# Patient Record
Sex: MALE | Race: WHITE | NOT HISPANIC OR LATINO | Employment: FULL TIME | URBAN - METROPOLITAN AREA
[De-identification: names, ages, dates, MRNs, and addresses within clinical notes are randomized per-mention and may not be internally consistent; named-entity substitution may affect disease eponyms.]

---

## 2021-10-09 PROCEDURE — U0004 COV-19 TEST NON-CDC HGH THRU: HCPCS | Performed by: NURSE PRACTITIONER

## 2021-11-18 PROCEDURE — 87070 CULTURE OTHR SPECIMN AEROBIC: CPT | Performed by: FAMILY MEDICINE

## 2021-11-18 PROCEDURE — 87147 CULTURE TYPE IMMUNOLOGIC: CPT | Performed by: FAMILY MEDICINE

## 2021-11-18 PROCEDURE — 87205 SMEAR GRAM STAIN: CPT | Performed by: FAMILY MEDICINE

## 2023-02-17 PROCEDURE — U0004 COV-19 TEST NON-CDC HGH THRU: HCPCS | Performed by: NURSE PRACTITIONER

## 2023-03-20 ENCOUNTER — PATIENT ROUNDING (BHMG ONLY) (OUTPATIENT)
Dept: FAMILY MEDICINE CLINIC | Facility: CLINIC | Age: 63
End: 2023-03-20
Payer: COMMERCIAL

## 2023-03-20 ENCOUNTER — PATIENT MESSAGE (OUTPATIENT)
Dept: FAMILY MEDICINE CLINIC | Facility: CLINIC | Age: 63
End: 2023-03-20

## 2023-03-20 ENCOUNTER — OFFICE VISIT (OUTPATIENT)
Dept: FAMILY MEDICINE CLINIC | Facility: CLINIC | Age: 63
End: 2023-03-20
Payer: COMMERCIAL

## 2023-03-20 VITALS
SYSTOLIC BLOOD PRESSURE: 128 MMHG | BODY MASS INDEX: 28.37 KG/M2 | OXYGEN SATURATION: 97 % | HEIGHT: 70 IN | WEIGHT: 198.2 LBS | HEART RATE: 92 BPM | DIASTOLIC BLOOD PRESSURE: 78 MMHG

## 2023-03-20 DIAGNOSIS — E11.65 TYPE 2 DIABETES MELLITUS WITH HYPERGLYCEMIA, WITHOUT LONG-TERM CURRENT USE OF INSULIN: Primary | ICD-10-CM

## 2023-03-20 DIAGNOSIS — G62.9 NEUROPATHY: ICD-10-CM

## 2023-03-20 DIAGNOSIS — E78.5 HYPERLIPIDEMIA, UNSPECIFIED HYPERLIPIDEMIA TYPE: ICD-10-CM

## 2023-03-20 DIAGNOSIS — Z12.5 PROSTATE CANCER SCREENING: ICD-10-CM

## 2023-03-20 PROBLEM — Z90.5 SINGLE KIDNEY: Status: ACTIVE | Noted: 2023-03-20

## 2023-03-20 PROBLEM — E11.9 DM (DIABETES MELLITUS), TYPE 2: Status: ACTIVE | Noted: 2023-03-20

## 2023-03-20 PROBLEM — E11.9 DM (DIABETES MELLITUS), TYPE 2: Status: RESOLVED | Noted: 2023-03-20 | Resolved: 2023-03-20

## 2023-03-20 LAB
EXPIRATION DATE: ABNORMAL
HBA1C MFR BLD: 7.9 %
Lab: ABNORMAL

## 2023-03-20 PROCEDURE — 83036 HEMOGLOBIN GLYCOSYLATED A1C: CPT | Performed by: INTERNAL MEDICINE

## 2023-03-20 PROCEDURE — 99204 OFFICE O/P NEW MOD 45 MIN: CPT | Performed by: INTERNAL MEDICINE

## 2023-03-20 NOTE — PROGRESS NOTES
Yasmani Harper  1960  [id number]  Patient Care Team:  Nolberto Nicholson MD as PCP - General (Internal Medicine)  Vicente Van MD as Consulting Physician (Gastroenterology)    Yasmani Harper is a 62 y.o. male here today to establish care.  This patient is accompanied by their self who contributes to the history of their care.    Chief Complaint:    Chief Complaint   Patient presents with   • Diabetes         History of Present Illness:   This is a 62-year-old gentleman with past medical history of diabetes mellitus, dyslipidemia, unilateral kidney secondary to right radical nephrectomy from renal cell carcinoma here to establish.  In addition to above he has diabetic neuropathy.  He openly advocates that he is not the most compliant person.  Describes himself as an attentive to his diet.  He is currently on Trulicity 3 mg weekly, indicates his metformin dose had recently been dropped/decreased however he checked with his wife and is currently on 1000 mg p.o. twice daily.  At one time he was on Jardiance however this was discontinued as his numbers improved.  He does not recall what his most recent A1c was.  He is past due on seeing ophthalmology.  He is currently on 1200 mg 3 times daily for diabetic neuropathy.      He does have meaningful dyslipidemia, he is currently on Lipitor 20 and Zetia 10 mg daily.  40 mg Lipitor caused myalgias necessitating a decrease in dose.  He is tolerating the current regimen.    He does admit to bilateral base of thumb pain that is sharp in nature.  It is intermittent.  He is a surgical tech at the [employer], fluoroscopy by the surgical resident indicated some mild degenerative changes.  He does not take anything over-the-counter.  Denies any swelling numbness or tingling.      HCM- CRS last year with Cologuard.  He has never had a pneumonia vaccination.  Typically doses flu vaccination..     Past Medical History:   Diagnosis Date   • Arthritis Years   • Diabetes  "mellitus (HCC)    • Erectile dysfunction Years   • History of kidney removal    • HL (hearing loss) Years   • Hyperlipidemia        Past Surgical History:   Procedure Laterality Date   • CYST REMOVAL     • NEPHRECTOMY     • TONSILLECTOMY          Family History   Problem Relation Age of Onset   • Cancer Father        Social History     Socioeconomic History   • Marital status:    Tobacco Use   • Smoking status: Never   • Smokeless tobacco: Never   Vaping Use   • Vaping Use: Never used   Substance and Sexual Activity   • Alcohol use: Never   • Drug use: Never   • Sexual activity: Not Currently     Partners: Female     Birth control/protection: Vasectomy       Allergies   Allergen Reactions   • Nsaids Other (See Comments)     Pt has 1 kidney unable to take NSAIDS        Review of Systems:    Review of Systems   Constitutional: Negative.    HENT: Negative.    Eyes: Negative.    Respiratory: Negative.    Cardiovascular: Negative.    Musculoskeletal: Positive for arthralgias.   Neurological:        Painful neuropathic pain in his feet.       Vitals:    03/20/23 1440   BP: 128/78   Pulse: 92   SpO2: 97%   Weight: 89.9 kg (198 lb 3.2 oz)   Height: 177.8 cm (70\")     Body mass index is 28.44 kg/m².      Current Outpatient Medications:   •  atorvastatin (LIPITOR) 20 MG tablet, , Disp: , Rfl:   •  ezetimibe (ZETIA) 10 MG tablet, , Disp: , Rfl:   •  gabapentin (NEURONTIN) 600 MG tablet, , Disp: , Rfl:   •  metFORMIN ER (GLUCOPHAGE-XR) 500 MG 24 hr tablet, , Disp: , Rfl:   •  Trulicity 1.5 MG/0.5ML solution pen-injector, Inject 3 mg as directed., Disp: , Rfl:   •  empagliflozin (Jardiance) 25 MG tablet tablet, Take 1 tablet by mouth Daily., Disp: 90 tablet, Rfl: 2    Physical Exam:    Physical Exam  Vitals and nursing note reviewed.   Constitutional:       General: He is not in acute distress.     Appearance: He is well-developed. He is not diaphoretic.   HENT:      Head: Normocephalic and atraumatic.      Right Ear: " External ear normal.      Left Ear: External ear normal.      Mouth/Throat:      Pharynx: No oropharyngeal exudate.   Eyes:      General: No scleral icterus.        Right eye: No discharge.      Conjunctiva/sclera: Conjunctivae normal.   Neck:      Thyroid: No thyromegaly.      Vascular: No JVD.      Trachea: No tracheal deviation.   Cardiovascular:      Rate and Rhythm: Normal rate and regular rhythm.      Heart sounds: Normal heart sounds.      Comments: PMI nondisplaced  Pulmonary:      Effort: Pulmonary effort is normal.      Breath sounds: Normal breath sounds. No wheezing or rales.   Abdominal:      General: Bowel sounds are normal.      Palpations: Abdomen is soft.      Tenderness: There is no abdominal tenderness. There is no guarding or rebound.   Musculoskeletal:      Cervical back: Normal range of motion and neck supple.      Comments: Normal gai.  Mild CMC tenderness to palpation especially with forced opposition.   Lymphadenopathy:      Cervical: No cervical adenopathy.   Skin:     General: Skin is warm and dry.      Capillary Refill: Capillary refill takes less than 2 seconds.      Coloration: Skin is not pale.      Findings: No rash.   Neurological:      Mental Status: He is alert and oriented to person, place, and time.      Motor: No abnormal muscle tone.      Coordination: Coordination normal.   Psychiatric:         Mood and Affect: Mood normal.         Behavior: Behavior normal.         Judgment: Judgment normal.         Procedures    Results Review:    None A1c today is 7.9    Assessment/Plan:     Problem List Items Addressed This Visit        Cardiac and Vasculature    Hyperlipidemia    Relevant Medications    atorvastatin (LIPITOR) 20 MG tablet    ezetimibe (ZETIA) 10 MG tablet    Other Relevant Orders    Comprehensive Metabolic Panel    Lipid Panel       Endocrine and Metabolic    Type 2 diabetes mellitus with hyperglycemia, without long-term current use of insulin (HCC) - Primary    Relevant  Medications    metFORMIN ER (GLUCOPHAGE-XR) 500 MG 24 hr tablet    Trulicity 1.5 MG/0.5ML solution pen-injector    empagliflozin (Jardiance) 25 MG tablet tablet    Other Relevant Orders    POC Glycosylated Hemoglobin (Hb A1C) (Completed)    Comprehensive Metabolic Panel    CBC (No Diff)       Neuro    Neuropathy    Current Assessment & Plan     Currently with gabapentin at 1200 mg po tid        Other Visit Diagnoses     Prostate cancer screening        Relevant Orders    PSA SCREENING          Plan of care reviewed with patient at the conclusion of today's visit. Education was provided regarding diagnosis and management.  Patient verbalizes understanding of and agreement with management plan.    Return in about 3 months (around 6/20/2023) for annual.    Nolberto Nicholson MD      Please note than portions of this note were completed Health system a Voice Recognition Program

## 2023-03-21 RX ORDER — METFORMIN HYDROCHLORIDE 500 MG/1
1000 TABLET, EXTENDED RELEASE ORAL 2 TIMES DAILY WITH MEALS
Qty: 60 TABLET | Refills: 2 | Status: CANCELLED | OUTPATIENT
Start: 2023-03-21

## 2023-04-03 RX ORDER — METFORMIN HYDROCHLORIDE 500 MG/1
1000 TABLET, EXTENDED RELEASE ORAL 2 TIMES DAILY
Qty: 180 TABLET | Refills: 3 | Status: SHIPPED | COMMUNITY
Start: 2023-04-03

## 2023-05-18 RX ORDER — GABAPENTIN 600 MG/1
TABLET ORAL
Status: CANCELLED | OUTPATIENT
Start: 2023-05-18

## 2023-05-18 NOTE — TELEPHONE ENCOUNTER
Rx Refill Note  Requested Prescriptions     Pending Prescriptions Disp Refills   • gabapentin (NEURONTIN) 600 MG tablet        Last office visit with prescribing clinician: 3/20/2023   Last telemedicine visit with prescribing clinician: 3/21/2023   Next office visit with prescribing clinician: 6/21/2023                         Would you like a call back once the refill request has been completed: [] Yes [] No    If the office needs to give you a call back, can they leave a voicemail: [] Yes [] No    Ai Helton MA  05/18/23, 11:48 EDT

## 2023-05-19 ENCOUNTER — OFFICE VISIT (OUTPATIENT)
Dept: FAMILY MEDICINE CLINIC | Facility: CLINIC | Age: 63
End: 2023-05-19
Payer: COMMERCIAL

## 2023-05-19 VITALS
HEART RATE: 97 BPM | BODY MASS INDEX: 26.14 KG/M2 | SYSTOLIC BLOOD PRESSURE: 126 MMHG | OXYGEN SATURATION: 97 % | WEIGHT: 182.6 LBS | HEIGHT: 70 IN | DIASTOLIC BLOOD PRESSURE: 76 MMHG

## 2023-05-19 DIAGNOSIS — G62.9 NEUROPATHY: ICD-10-CM

## 2023-05-19 DIAGNOSIS — Z51.81 MEDICATION MONITORING ENCOUNTER: Primary | ICD-10-CM

## 2023-05-19 RX ORDER — DULAGLUTIDE 3 MG/.5ML
INJECTION, SOLUTION SUBCUTANEOUS
COMMUNITY
Start: 2023-03-22

## 2023-05-19 RX ORDER — GABAPENTIN 600 MG/1
1200 TABLET ORAL 3 TIMES DAILY
Qty: 180 TABLET | Refills: 3 | Status: SHIPPED | COMMUNITY
Start: 2023-05-19 | End: 2023-05-19 | Stop reason: SDUPTHER

## 2023-05-19 RX ORDER — GABAPENTIN 600 MG/1
1200 TABLET ORAL 3 TIMES DAILY
Qty: 540 TABLET | Refills: 0 | Status: SHIPPED | OUTPATIENT
Start: 2023-05-19

## 2023-05-19 NOTE — PROGRESS NOTES
"Yasmani Harper  1960  [id number]  Patient Care Team:  Nolberto Nicholson MD as PCP - General (Internal Medicine)  Vicente Van MD as Consulting Physician (Gastroenterology)    Yasmani Harper is a 62 y.o. male here today for follow up.     This patient is accompanied by their self who contributes to the history of their care.    Chief Complaint:    Chief Complaint   Patient presents with   • Peripheral Neuropathy     Medication refill          History of Present Illness:  I have reviewed and/or updated the patient's past medical, past surgical, family, social history, problem list and allergies as appropriate.     Gentleman is here to follow-up for diabetic neuropathy.  He takes 1200 mg 3 times daily as he has done for years.  He agrees to a controlled substance agreement and urine drug screen.  He has not been checking his sugars as we discussed at his last visit.  Is too early for his A1c however he did increase his metformin to 1 g p.o. daily and continues on Jardiance 25 mg daily.  He has not been going out to the garage to get his Trulicity on the refrigerator so he has missed several doses of this.  His pain is typically in his feet burning electrical dysesthesias    Review of Systems   Constitutional: Negative.    Endocrine: Negative.    Musculoskeletal: Negative.    Neurological:        Nocturnal dysesthesias feet   Psychiatric/Behavioral: Negative.        Vitals:    05/19/23 1306   BP: 126/76   Pulse: 97   SpO2: 97%   Weight: 82.8 kg (182 lb 9.6 oz)   Height: 177.8 cm (70\")     Body mass index is 26.2 kg/m².    Physical Exam  Vitals and nursing note reviewed.   Constitutional:       General: He is not in acute distress.     Appearance: He is well-developed. He is not diaphoretic.   HENT:      Head: Normocephalic and atraumatic.      Right Ear: External ear normal.      Left Ear: External ear normal.      Mouth/Throat:      Pharynx: No oropharyngeal exudate.   Eyes:      General: No " scleral icterus.        Right eye: No discharge.      Conjunctiva/sclera: Conjunctivae normal.   Neck:      Thyroid: No thyromegaly.      Vascular: No JVD.      Trachea: No tracheal deviation.   Cardiovascular:      Rate and Rhythm: Normal rate and regular rhythm.      Heart sounds: Normal heart sounds.      Comments: PMI nondisplaced  Pulmonary:      Effort: Pulmonary effort is normal.      Breath sounds: Normal breath sounds. No wheezing or rales.   Abdominal:      General: Bowel sounds are normal.      Palpations: Abdomen is soft.      Tenderness: There is no abdominal tenderness. There is no guarding or rebound.   Musculoskeletal:      Cervical back: Normal range of motion and neck supple.   Lymphadenopathy:      Cervical: No cervical adenopathy.   Skin:     General: Skin is warm and dry.      Capillary Refill: Capillary refill takes less than 2 seconds.      Coloration: Skin is not pale.      Findings: No rash.   Neurological:      Mental Status: He is alert and oriented to person, place, and time.      Motor: No abnormal muscle tone.      Coordination: Coordination normal.   Psychiatric:         Judgment: Judgment normal.         Procedures    Results Review:    None    Assessment/Plan:    Problem List Items Addressed This Visit        Neuro    Neuropathy    Relevant Medications    gabapentin (NEURONTIN) 600 MG tablet   Other Visit Diagnoses     Medication monitoring encounter    -  Primary    Relevant Orders    Compliance Drug Analysis, Ur - Urine, Clean Catch      He has entered into a [facility] urine drug screen secured.  PDMP was reviewed.    Plan of care reviewed with patient at the conclusion of today's visit. Education was provided regarding diagnosis and management.  Patient verbalizes understanding of and agreement with management plan.    No follow-ups on file.    Nolberto Nicholson MD      Please note than portions of this note were completed [facility] a Voice Recognition Program

## 2023-05-25 LAB — DRUGS UR: NORMAL

## 2023-06-19 ENCOUNTER — PATIENT MESSAGE (OUTPATIENT)
Dept: FAMILY MEDICINE CLINIC | Facility: CLINIC | Age: 63
End: 2023-06-19
Payer: COMMERCIAL

## 2023-06-21 PROBLEM — Z00.00 ANNUAL PHYSICAL EXAM: Status: ACTIVE | Noted: 2023-06-21

## 2023-07-13 PROBLEM — R20.8 ALLODYNIA: Status: ACTIVE | Noted: 2023-07-13

## 2023-08-07 RX ORDER — ATORVASTATIN CALCIUM 20 MG/1
20 TABLET, FILM COATED ORAL NIGHTLY
Qty: 90 TABLET | Refills: 3 | Status: SHIPPED | OUTPATIENT
Start: 2023-08-07

## 2023-08-07 NOTE — TELEPHONE ENCOUNTER
Rx Refill Note  Requested Prescriptions     Pending Prescriptions Disp Refills    atorvastatin (LIPITOR) 20 MG tablet 90 tablet       Last office visit with prescribing clinician: 7/13/2023   Last telemedicine visit with prescribing clinician: Visit date not found   Next office visit with prescribing clinician: 9/21/2023                         Would you like a call back once the refill request has been completed: [] Yes [] No    If the office needs to give you a call back, can they leave a voicemail: [] Yes [] No    Elaine Ga MA  08/07/23, 07:51 EDT

## 2023-08-13 DIAGNOSIS — G62.9 NEUROPATHY: ICD-10-CM

## 2023-08-15 NOTE — TELEPHONE ENCOUNTER
Rx Refill Note  Requested Prescriptions     Pending Prescriptions Disp Refills    gabapentin (NEURONTIN) 600 MG tablet 540 tablet 0     Sig: Take 2 tablets by mouth 3 (Three) Times a Day.      Last office visit with prescribing clinician: 7/13/2023   Last telemedicine visit with prescribing clinician: Visit date not found   Next office visit with prescribing clinician: 9/21/2023                         Would you like a call back once the refill request has been completed: [] Yes [] No    If the office needs to give you a call back, can they leave a voicemail: [] Yes [] No    Paige Flores MA  08/15/23, 12:54 EDT    UDS and CSA 05/19/2023

## 2023-08-16 RX ORDER — GABAPENTIN 600 MG/1
1200 TABLET ORAL 3 TIMES DAILY
Qty: 540 TABLET | Refills: 0 | Status: SHIPPED | OUTPATIENT
Start: 2023-08-16

## 2023-09-21 ENCOUNTER — OFFICE VISIT (OUTPATIENT)
Dept: FAMILY MEDICINE CLINIC | Facility: CLINIC | Age: 63
End: 2023-09-21
Payer: COMMERCIAL

## 2023-09-21 VITALS
WEIGHT: 187.6 LBS | BODY MASS INDEX: 26.86 KG/M2 | HEART RATE: 101 BPM | DIASTOLIC BLOOD PRESSURE: 78 MMHG | SYSTOLIC BLOOD PRESSURE: 118 MMHG | OXYGEN SATURATION: 95 % | HEIGHT: 70 IN

## 2023-09-21 DIAGNOSIS — E11.65 TYPE 2 DIABETES MELLITUS WITH HYPERGLYCEMIA, WITHOUT LONG-TERM CURRENT USE OF INSULIN: Primary | ICD-10-CM

## 2023-09-21 DIAGNOSIS — E78.5 HYPERLIPIDEMIA, UNSPECIFIED HYPERLIPIDEMIA TYPE: ICD-10-CM

## 2023-09-21 DIAGNOSIS — G62.9 NEUROPATHY: ICD-10-CM

## 2023-09-21 LAB
EXPIRATION DATE: ABNORMAL
HBA1C MFR BLD: 8.4 %
Lab: ABNORMAL

## 2023-09-21 RX ORDER — DULAGLUTIDE 4.5 MG/.5ML
4.5 INJECTION, SOLUTION SUBCUTANEOUS
Qty: 2 ML | Refills: 0 | Status: SHIPPED | OUTPATIENT
Start: 2023-12-14 | End: 2024-01-05

## 2023-09-21 RX ORDER — DULAGLUTIDE 0.75 MG/.5ML
0.75 INJECTION, SOLUTION SUBCUTANEOUS
Qty: 2 ML | Refills: 0 | Status: SHIPPED | OUTPATIENT
Start: 2023-09-21 | End: 2023-10-13

## 2023-09-21 RX ORDER — DULAGLUTIDE 1.5 MG/.5ML
1.5 INJECTION, SOLUTION SUBCUTANEOUS
Qty: 2 ML | Refills: 0 | Status: SHIPPED | OUTPATIENT
Start: 2023-10-19 | End: 2023-11-10

## 2023-09-21 RX ORDER — DULAGLUTIDE 3 MG/.5ML
3 INJECTION, SOLUTION SUBCUTANEOUS
Qty: 2 ML | Refills: 0 | Status: SHIPPED | OUTPATIENT
Start: 2023-11-16 | End: 2023-12-08

## 2023-09-21 RX ORDER — GABAPENTIN 600 MG/1
1200 TABLET ORAL 3 TIMES DAILY
Qty: 540 TABLET | Refills: 0 | Status: SHIPPED | OUTPATIENT
Start: 2023-09-21

## 2023-09-21 NOTE — PROGRESS NOTES
Yasmani Harper  1960  [id number]  Patient Care Team:  Nolberto Nicholson MD as PCP - General (Internal Medicine)  Vicente Van MD as Consulting Physician (Gastroenterology)    Yasmani Harper is a 62 y.o. male here today for follow up.     This patient is accompanied by their self who contributes to the history of their care.    Chief Complaint:    Chief Complaint   Patient presents with    Diabetes        History of Present Illness:  I have reviewed and/or updated the patient's past medical, past surgical, family, social history, problem list and allergies as appropriate.     His allodynia resolved after stopping Ozempic - took 2 weeks- but nno further allodynia. He did tolerate trulicity for years without issues. He is willing to try this again. He does not use cgm currently as it was giving erroneus false lows. Denies PU/PD. Current with opth.  He continues walking for exercise.  He remains on Zetia as well as Lipitor 20 mg daily without any perceived side effects of myalgias or dark-colored urine.    Review of Systems   Constitutional:  Negative for chills, fatigue, fever, unexpected weight gain and unexpected weight loss.   HENT:  Negative for ear pain, postnasal drip, sinus pressure and sore throat.    Eyes:  Negative for blurred vision, double vision and visual disturbance.   Respiratory:  Negative for cough, shortness of breath and wheezing.    Cardiovascular:  Negative for chest pain, palpitations and leg swelling.   Gastrointestinal:  Negative for abdominal pain, blood in stool, diarrhea, nausea and vomiting.   Endocrine: Negative for cold intolerance, heat intolerance, polydipsia, polyphagia and polyuria.   Genitourinary:  Negative for dysuria, flank pain and hematuria.   Musculoskeletal:  Negative for arthralgias and joint swelling.   Skin:  Negative for dry skin and rash.   Neurological:  Negative for weakness, numbness and headache.   Psychiatric/Behavioral:  Negative for  "self-injury, suicidal ideas and depressed mood.      Vitals:    09/21/23 1543   BP: 118/78   Pulse: 101   SpO2: 95%   Weight: 85.1 kg (187 lb 9.6 oz)   Height: 177.8 cm (70\")     Body mass index is 26.92 kg/m².    Physical Exam  Vitals and nursing note reviewed.   Constitutional:       General: He is not in acute distress.     Appearance: He is well-developed. He is not diaphoretic.   HENT:      Head: Normocephalic and atraumatic.      Right Ear: External ear normal.      Left Ear: External ear normal.      Mouth/Throat:      Pharynx: No oropharyngeal exudate.   Eyes:      General: No scleral icterus.        Right eye: No discharge.      Conjunctiva/sclera: Conjunctivae normal.   Neck:      Thyroid: No thyromegaly.      Vascular: No JVD.      Trachea: No tracheal deviation.   Cardiovascular:      Rate and Rhythm: Normal rate and regular rhythm.      Heart sounds: Normal heart sounds.      Comments: PMI nondisplaced  Pulmonary:      Effort: Pulmonary effort is normal.      Breath sounds: Normal breath sounds. No wheezing or rales.   Abdominal:      General: Bowel sounds are normal.      Palpations: Abdomen is soft.      Tenderness: There is no abdominal tenderness. There is no guarding or rebound.   Musculoskeletal:      Cervical back: Normal range of motion and neck supple.   Lymphadenopathy:      Cervical: No cervical adenopathy.   Skin:     General: Skin is warm and dry.      Capillary Refill: Capillary refill takes less than 2 seconds.      Coloration: Skin is not pale.      Findings: No rash.   Neurological:      Mental Status: He is alert and oriented to person, place, and time.      Motor: No abnormal muscle tone.      Coordination: Coordination normal.   Psychiatric:         Judgment: Judgment normal.       Procedures    Results Review:    I reviewed the patient's new clinical results.  omponent  Ref Range & Units 16:08 3 mo ago 6 mo ago   Hemoglobin A1C  % 8.4 7.4 7.9       Assessment/Plan:    Problem List " Items Addressed This Visit          Cardiac and Vasculature    Hyperlipidemia    Current Assessment & Plan     Lipid abnormalities are unchanged.  Pharmacotherapy as ordered.  Lipids will be reassessed in 6 months.         Relevant Medications    ezetimibe (ZETIA) 10 MG tablet    atorvastatin (LIPITOR) 20 MG tablet       Endocrine and Metabolic    Type 2 diabetes mellitus with hyperglycemia, without long-term current use of insulin - Primary    Current Assessment & Plan     Diabetes is worsening.   Reminded to bring in blood sugar diary at next visit.  Dietary recommendations for ADA diet.  Regular aerobic exercise.  Discussed foot care.  Reminded to get yearly retinal exam.  Diabetes will be reassessed in 3 months.  Resume Trulicity has tolerated this in the past.  We will start from 0.75 mg uptitrate over time to 4.5 mg.  Continue metformin and Jardiance.         Relevant Medications    metFORMIN ER (GLUCOPHAGE-XR) 500 MG 24 hr tablet    empagliflozin (Jardiance) 25 MG tablet tablet    Dulaglutide (Trulicity) 0.75 MG/0.5ML solution pen-injector    Dulaglutide (Trulicity) 1.5 MG/0.5ML solution pen-injector (Start on 10/19/2023)    Dulaglutide (Trulicity) 3 MG/0.5ML solution pen-injector (Start on 11/16/2023)    Dulaglutide (Trulicity) 4.5 MG/0.5ML solution pen-injector (Start on 12/14/2023)    Other Relevant Orders    POC Glycosylated Hemoglobin (Hb A1C) (Completed)       Neuro    Neuropathy    Current Assessment & Plan     Continues to be controlled with the gabapentin 1200 mg 3 times daily.  Refill authorized.  , UDS and CSA reviewed         Relevant Medications    gabapentin (NEURONTIN) 600 MG tablet       Plan of care reviewed with patient at the conclusion of today's visit. Education was provided regarding diagnosis and management.  Patient verbalizes understanding of and agreement with management plan.    Return in about 3 months (around 12/21/2023) for dm2.    Nolberto Nicholson MD      Please note than  portions of this note were completed wt a Voice Recognition Program

## 2023-09-21 NOTE — ASSESSMENT & PLAN NOTE
Continues to be controlled with the gabapentin 1200 mg 3 times daily.  Refill authorized.  , UDS and CSA reviewed

## 2023-09-21 NOTE — ASSESSMENT & PLAN NOTE
Diabetes is worsening.   Reminded to bring in blood sugar diary at next visit.  Dietary recommendations for ADA diet.  Regular aerobic exercise.  Discussed foot care.  Reminded to get yearly retinal exam.  Diabetes will be reassessed in 3 months.  Resume Trulicity has tolerated this in the past.  We will start from 0.75 mg uptitrate over time to 4.5 mg.  Continue metformin and Jardiance.

## 2023-10-18 RX ORDER — DULAGLUTIDE 0.75 MG/.5ML
INJECTION, SOLUTION SUBCUTANEOUS
Qty: 2 ML | Refills: 0 | Status: SHIPPED | OUTPATIENT
Start: 2023-10-18

## 2023-10-18 NOTE — TELEPHONE ENCOUNTER
Rx Refill Note  Requested Prescriptions     Pending Prescriptions Disp Refills    Trulicity 0.75 MG/0.5ML solution pen-injector [Pharmacy Med Name: TRULICITY 0.75 MG/0.5 ML PEN] 2 mL 0     Sig: INJECT 0.75 MG UNDER THE SKIN ONCE WEEKLY FOR 4 DOSES CONTINUE FOR 4 WEEKS THEN INCREASE TO NEXT DOSE.      Last office visit with prescribing clinician: 9/21/2023     Next office visit with prescribing clinician: 12/29/2023       Jeanette Alberto MA  10/18/23, 08:54 EDT

## 2023-11-17 RX ORDER — DULAGLUTIDE 1.5 MG/.5ML
INJECTION, SOLUTION SUBCUTANEOUS
Qty: 2 ML | Refills: 1 | Status: SHIPPED | OUTPATIENT
Start: 2023-11-17

## 2023-11-27 NOTE — TELEPHONE ENCOUNTER
Nayan Valenzuela is a 70 year old male here for  Chief Complaint   Patient presents with   • Follow-up     6 week MD follow up     Denies latex allergy or sensitivity.    Medication verified, no changes.  PCP and Pharmacy verified.    Social History     Tobacco Use   Smoking Status Never   • Passive exposure: Past   Smokeless Tobacco Never     Advance Directives Filed: Yes    ECOG:   ECOG [11/27/23 1258]   ECOG Performance Status 1       Vitals:    There were no vitals taken for this visit.    These vital signs are:  Within defined parameters (Per Reference \"Defined Limits Hospital Outpatient Department (HOD)\")    Height: No.  Ht Readings from Last 1 Encounters:   11/16/23 6' 2\" (1.88 m)     Weight:Yes, shoes on.  Wt Readings from Last 3 Encounters:   11/16/23 126.5 kg (278 lb 14.1 oz)   10/30/23 128.6 kg (283 lb 9.6 oz)   10/13/23 126 kg (277 lb 12.5 oz)       BMI: There is no height or weight on file to calculate BMI.    REVIEW OF SYSTEMS  GENERAL:  Patient denies headache, fevers, chills, night sweats, excessive fatigue, change in appetite, weight loss, dizziness  ALLERGIC/IMMUNOLOGIC: Verified allergies: Yes  EYES:  Patient denies significant visual difficulties, double vision, blurred vision  ENT/MOUTH: Patient denies problems with hearing, sore throat, sinus drainage, mouth sores  ENDOCRINE:  Patient denies diabetes, thyroid disease, hormone replacement, hot flashes  HEMATOLOGIC/LYMPHATIC: Patient denies easy bruising, bleeding, tender lymph nodes, swollen lymph nodes  BREASTS: Patient denies abnormal masses of breast, nipple discharge, pain  RESPIRATORY:  Patient denies lung pain with breathing, cough, coughing up blood, shortness of breath  CARDIOVASCULAR:  Patient denies anginal chest pain, palpitations, shortness of breath when lying flat, peripheral edema  GASTROINTESTINAL: Patient denies abdominal pain , nausea, vomiting, diarrhea, GI bleeding, constipation, change in bowel habits, heartburn, sensation of  Rx Refill Note  Requested Prescriptions     Pending Prescriptions Disp Refills    Trulicity 1.5 MG/0.5ML solution pen-injector [Pharmacy Med Name: TRULICITY 1.5 MG/0.5 ML PEN] 2 mL 0     Sig: INJECT 1.5 MG UNDER THE SKIN ONCE WEEKLY FOR 4 WEEKS, THEN INCREASE TO NEXT DOSE      Last office visit with prescribing clinician: 9/21/2023     Next office visit with prescribing clinician: 12/29/2023   Vira Javier MA  11/17/23, 12:57 EST   feeling full, difficulty swallowing  : Patient denies blood in the urine, burning with urination, frequency, urgency, hesitancy, incontinence  MUSCULOSKELETAL:  Patient denies joint pain, bone pain, joint swelling, redness, decreased range of motion  SKIN:  Patient denies chronic rashes, inflammation, ulcerations, skin changes, itching  NEUROLOGIC:  Patient denies areas of focal weakness, abnormal gait, sensory problems, numbness, tingling  PSYCHIATRIC: Patient denies insomnia, depression, anxiety    This patient reported abnormal symptoms that needed immediate verbal communication: Yes, these symptoms included on ROS and were reported to MD.

## 2023-12-29 ENCOUNTER — OFFICE VISIT (OUTPATIENT)
Dept: FAMILY MEDICINE CLINIC | Facility: CLINIC | Age: 63
End: 2023-12-29
Payer: COMMERCIAL

## 2023-12-29 VITALS
HEIGHT: 70 IN | SYSTOLIC BLOOD PRESSURE: 130 MMHG | WEIGHT: 181.6 LBS | BODY MASS INDEX: 26 KG/M2 | HEART RATE: 94 BPM | OXYGEN SATURATION: 98 % | DIASTOLIC BLOOD PRESSURE: 82 MMHG

## 2023-12-29 DIAGNOSIS — J01.10 ACUTE NON-RECURRENT FRONTAL SINUSITIS: ICD-10-CM

## 2023-12-29 DIAGNOSIS — E11.65 TYPE 2 DIABETES MELLITUS WITH HYPERGLYCEMIA, WITHOUT LONG-TERM CURRENT USE OF INSULIN: Primary | ICD-10-CM

## 2023-12-29 DIAGNOSIS — E78.5 HYPERLIPIDEMIA, UNSPECIFIED HYPERLIPIDEMIA TYPE: ICD-10-CM

## 2023-12-29 LAB
EXPIRATION DATE: ABNORMAL
HBA1C MFR BLD: 7.5 % (ref 4.5–5.7)
Lab: ABNORMAL

## 2023-12-29 RX ORDER — BENZONATATE 100 MG/1
100 CAPSULE ORAL 3 TIMES DAILY PRN
Qty: 60 CAPSULE | Refills: 1 | Status: SHIPPED | OUTPATIENT
Start: 2023-12-29

## 2023-12-29 RX ORDER — FLUTICASONE PROPIONATE 50 MCG
2 SPRAY, SUSPENSION (ML) NASAL DAILY
Qty: 5 ML | Refills: 2 | Status: SHIPPED | OUTPATIENT
Start: 2023-12-29

## 2023-12-29 RX ORDER — GUAIFENESIN, PSEUDOEPHEDRINE HYDROCHLORIDE 600; 60 MG/1; MG/1
1 TABLET, EXTENDED RELEASE ORAL EVERY 12 HOURS
Qty: 28 TABLET | Refills: 0 | Status: SHIPPED | OUTPATIENT
Start: 2023-12-29

## 2023-12-29 RX ORDER — DOXYCYCLINE HYCLATE 100 MG/1
100 CAPSULE ORAL 2 TIMES DAILY
Qty: 20 CAPSULE | Refills: 0 | Status: SHIPPED | OUTPATIENT
Start: 2023-12-29

## 2023-12-29 NOTE — ASSESSMENT & PLAN NOTE
Diabetes is improving with treatment.   Continue current treatment regimen.  Reminded to bring in blood sugar diary at next visit.  Dietary recommendations for ADA diet.  Discussed foot care.  Reminded to get yearly retinal exam.  Increase Trulicity to 4.5 mg weekly  Diabetes will be reassessed in 3 months.

## 2023-12-29 NOTE — PROGRESS NOTES
Yasmani Harper  1960  [id number]  Patient Care Team:  Nolberto Nicholson MD as PCP - General (Internal Medicine)  Vicente Van MD as Consulting Physician (Gastroenterology)    Yasmani Harper is a 63 y.o. male here today for follow up.     This patient is accompanied by their self who contributes to the history of their care.    Chief Complaint:    Chief Complaint   Patient presents with    Diabetes     3 month follow up    Chills    Generalized Body Aches    Cough     Ongoing since Monday evening.    Nasal Congestion     Chest congestion also        History of Present Illness:  I have reviewed and/or updated the patient's past medical, past surgical, family, social history, problem list and allergies as appropriate.     Continue trulicity. No hypoglycemia still with neuropathic pain.  Gabapentin 600 mg or 1200 mg 3 times daily is taken.  He has not seen his ophthalmologist and is past due on this.  I reminded he and his wife of this.  Continues on highest dose of Trulicity starting this weekend.  Tolerating this without any abdominal pain nausea or vomiting.  Continues on Jardiance 25 mg daily as well as metformin.  Remains on acetaminophen being for cholesterol.  This is in addition to atorvastatin 20 mg daily.  Denies any muscle aches or urinary color changes.    He has not felt well this week.  Reports illness since Monday. Cough chills and ST. Has lots of drainage. + soa no wheezing.there is frontal pressure.  No nausea or vomiting.  Has tried some over-the-counter cough medicine without any improvement.  No hemoptysis.    He reports dysphagia with certain foods. No reflux.  No nighttime cough.  He declines GI referral at present.    Review of Systems   Constitutional: Negative.    HENT:  Positive for postnasal drip, sinus pressure and sore throat. Negative for ear pain.    Eyes: Negative.    Respiratory:  Positive for cough and shortness of breath.        Vitals:    12/29/23 1536   BP:  "130/82   Pulse: 94   SpO2: 98%   Weight: 82.4 kg (181 lb 9.6 oz)   Height: 177.8 cm (70\")     Body mass index is 26.06 kg/m².    Physical Exam    Procedures    Results Review:    I reviewed the patient's new clinical results.  1C is improved to 7.4, flu AB and COVID-negative    Assessment/Plan:    Problem List Items Addressed This Visit       Type 2 diabetes mellitus with hyperglycemia, without long-term current use of insulin - Primary    Current Assessment & Plan     Diabetes is improving with treatment.   Continue current treatment regimen.  Reminded to bring in blood sugar diary at next visit.  Dietary recommendations for ADA diet.  Discussed foot care.  Reminded to get yearly retinal exam.  Increase Trulicity to 4.5 mg weekly  Diabetes will be reassessed in 3 months.         Relevant Medications    metFORMIN ER (GLUCOPHAGE-XR) 500 MG 24 hr tablet    empagliflozin (Jardiance) 25 MG tablet tablet    Dulaglutide (Trulicity) 4.5 MG/0.5ML solution pen-injector    Trulicity 0.75 MG/0.5ML solution pen-injector    Other Relevant Orders    POC Glycosylated Hemoglobin (Hb A1C) (Completed)    Hyperlipidemia    Relevant Medications    ezetimibe (ZETIA) 10 MG tablet    atorvastatin (LIPITOR) 20 MG tablet    Acute non-recurrent frontal sinusitis    Current Assessment & Plan     Doxycycline 100 mg p.o. twice daily Flonase as well as Mucinex D.            Plan of care reviewed with patient at the conclusion of today's visit. Education was provided regarding diagnosis and management.  Patient verbalizes understanding of and agreement with management plan.    Return in about 3 months (around 3/29/2024) for dm/htn.    Nolberto Nicholson MD      Please note than portions of this note were completed wt a Voice Recognition Program          Answers submitted by the patient for this visit:  Primary Reason for Visit (Submitted on 12/28/2023)  What is the primary reason for your visit?: Other  Other (Submitted on 12/28/2023)  Please " describe your symptoms.: 3mo chk up.  Have you had these symptoms before?: No  How long have you been having these symptoms?: 1-4 days

## 2024-01-10 ENCOUNTER — TELEPHONE (OUTPATIENT)
Dept: FAMILY MEDICINE CLINIC | Facility: CLINIC | Age: 64
End: 2024-01-10
Payer: COMMERCIAL

## 2024-01-10 RX ORDER — DULAGLUTIDE 4.5 MG/.5ML
INJECTION, SOLUTION SUBCUTANEOUS
Qty: 2 ML | Refills: 1 | Status: SHIPPED | OUTPATIENT
Start: 2024-01-10 | End: 2024-01-10

## 2024-01-10 RX ORDER — DULAGLUTIDE 4.5 MG/.5ML
4.5 INJECTION, SOLUTION SUBCUTANEOUS WEEKLY
Qty: 6 ML | Refills: 2 | Status: SHIPPED | OUTPATIENT
Start: 2024-01-10

## 2024-01-10 NOTE — TELEPHONE ENCOUNTER
Rx Refill Note  Requested Prescriptions     Signed Prescriptions Disp Refills    Dulaglutide (Trulicity) 4.5 MG/0.5ML solution pen-injector 2 mL 1     Sig: INJECT 4.5 MG UNDER THE SKIN ONCE WEEKLY     Authorizing Provider: NAVEED MA     Ordering User: DOLLY URBAN      Last office visit with prescribing clinician: 12/29/2023     Next office visit with prescribing clinician: 4/1/2024   Vira Javier MA  01/10/24, 07:47 EST

## 2024-01-28 NOTE — TELEPHONE ENCOUNTER
Pharmacy Name: [id number] [place] [facility] [address] [facility] - [phone]  - [phone]      Pharmacy representative name: ERNESTO    Pharmacy representative phone number: [phone]     What medication are you calling in regards to: TRULICITY    What question does the pharmacy have: PATIENT WOULD LIKE TO GET A 3 MONTH SUPPLY. PLEASE VERIFY IF THAT IS OKAY.    Who is the provider that prescribed the medication: DR MA   to go home

## 2024-02-21 DIAGNOSIS — G62.9 NEUROPATHY: ICD-10-CM

## 2024-02-21 RX ORDER — GABAPENTIN 600 MG/1
1200 TABLET ORAL 3 TIMES DAILY
Qty: 540 TABLET | Refills: 0 | Status: SHIPPED | OUTPATIENT
Start: 2024-02-21

## 2024-02-21 NOTE — TELEPHONE ENCOUNTER
Caller: Yasmani Harper    Relationship: Self    Best call back number: [phone]    Requested Prescriptions:   Requested Prescriptions     Pending Prescriptions Disp Refills    gabapentin (NEURONTIN) 600 MG tablet 540 tablet 0     Sig: Take 2 tablets by mouth 3 (Three) Times a Day.        Pharmacy where request should be sent: [facility] [id number] [id number] & [facility] - [phone] [facility] [phone] FX     Last office visit with prescribing clinician: 12/29/2023   Last telemedicine visit with prescribing clinician: Visit date not found   Next office visit with prescribing clinician: 4/1/2024     Additional details provided by patient:     Does the patient have less than a 3 day supply:  [x] Yes  [] No    Duran Singleton Rep   02/21/24 15:50 EST

## 2024-03-05 ENCOUNTER — TELEPHONE (OUTPATIENT)
Dept: FAMILY MEDICINE CLINIC | Facility: CLINIC | Age: 64
End: 2024-03-05
Payer: COMMERCIAL

## 2024-03-05 DIAGNOSIS — F41.9 ANXIETY: Primary | ICD-10-CM

## 2024-03-05 RX ORDER — DIAZEPAM 5 MG/1
5 TABLET ORAL 2 TIMES DAILY PRN
Qty: 6 TABLET | Refills: 0 | Status: SHIPPED | OUTPATIENT
Start: 2024-03-05

## 2024-03-05 NOTE — TELEPHONE ENCOUNTER
Caller: YESY SAN    Relationship: Emergency Contact    Best call back number: [phone]     What medication are you requesting: SOMETHING TO HELP KEEP HIM CALM DURING EYE PROCEDURE.    What are your current symptoms: ANXIETY     Have you had these symptoms before:    [] Yes  [x] No    Have you been treated for these symptoms before:   [] Yes  [x] No    If a prescription is needed, what is your preferred pharmacy and phone number: [facility] [id number] - [address] - [phone] [facility] [phone]      Additional notes:  WIFE OF PATIENT HAS CALLED REQUESTING IF PCP CAN CALL SOMETHING IN FOR PATIENT FOR HIS ANXIETY WHILE HAVING EYE PROCEDURE DONE. PATIENT IS NEEDING SOMETHING TO KEEP CALM DURING PROCEDURE. WIFE IS REQUESTING A CALL BACK EITHER WAY TO LET HER KNOW IF SOMETHING WILL BE CALLED IN.

## 2024-03-05 NOTE — TELEPHONE ENCOUNTER
Left message for Yasmani Harper  to return my call.    Hub may relay message and document    Nolberto Nicholson MD  You2 hours ago (12:43 PM)       Diazepam sent to pharmacy

## 2024-03-05 NOTE — TELEPHONE ENCOUNTER
Name: YESY SAN    Relationship: Emergency Contact    Best Callback Number:     [phone] (Mobile)       HUB PROVIDED THE RELAY MESSAGE FROM THE OFFICE   PATIENT VOICED UNDERSTANDING AND HAS NO FURTHER QUESTIONS AT THIS TIME    ADDITIONAL INFORMATION:

## 2024-03-06 RX ORDER — METFORMIN HYDROCHLORIDE 500 MG/1
1000 TABLET, EXTENDED RELEASE ORAL 2 TIMES DAILY
Qty: 180 TABLET | Refills: 1 | Status: SHIPPED | OUTPATIENT
Start: 2024-03-06

## 2024-04-01 ENCOUNTER — OFFICE VISIT (OUTPATIENT)
Dept: FAMILY MEDICINE CLINIC | Facility: CLINIC | Age: 64
End: 2024-04-01
Payer: COMMERCIAL

## 2024-04-01 VITALS
SYSTOLIC BLOOD PRESSURE: 122 MMHG | OXYGEN SATURATION: 97 % | DIASTOLIC BLOOD PRESSURE: 78 MMHG | HEART RATE: 87 BPM | HEIGHT: 70 IN | BODY MASS INDEX: 24.97 KG/M2 | WEIGHT: 174.4 LBS

## 2024-04-01 DIAGNOSIS — G62.9 NEUROPATHY: ICD-10-CM

## 2024-04-01 DIAGNOSIS — E11.65 TYPE 2 DIABETES MELLITUS WITH HYPERGLYCEMIA, WITHOUT LONG-TERM CURRENT USE OF INSULIN: Primary | ICD-10-CM

## 2024-04-01 DIAGNOSIS — E78.5 HYPERLIPIDEMIA, UNSPECIFIED HYPERLIPIDEMIA TYPE: ICD-10-CM

## 2024-04-01 LAB
EXPIRATION DATE: ABNORMAL
HBA1C MFR BLD: 6.3 % (ref 4.5–5.7)
Lab: ABNORMAL

## 2024-04-01 PROCEDURE — 82043 UR ALBUMIN QUANTITATIVE: CPT | Performed by: INTERNAL MEDICINE

## 2024-04-01 RX ORDER — METFORMIN HYDROCHLORIDE 500 MG/1
1000 TABLET, EXTENDED RELEASE ORAL 2 TIMES DAILY
Qty: 360 TABLET | Refills: 1 | Status: SHIPPED | OUTPATIENT
Start: 2024-04-01

## 2024-04-01 NOTE — PROGRESS NOTES
Yasmani Harper  1960  [id number]  Patient Care Team:  Nolberto Nicholson MD as PCP - General (Internal Medicine)  Vicente Van MD as Consulting Physician (Gastroenterology)    Yasmani Harper is a 63 y.o. male here today for follow up.     This patient is accompanied by their self who contributes to the history of their care.    Chief Complaint:    Chief Complaint   Patient presents with    Diabetes    Hypertension        History of Present Illness:  I have reviewed and/or updated the patient's past medical, past surgical, family, social history, problem list and allergies as appropriate.     Yasmani is here to follow-up on diabetes pretension and dyslipidemia... Continues trulicity, 4.5 mg/week.. No hypoglycemia.  still with neuropathic pain.  Gabapentin 600 mg or 1200 mg 3 times daily is taken.  He has not seen his ophthalmologist and is past due on this.  I reminded he and his wife of this.     Tolerating Trulicity and metformin.  This without any abdominal pain nausea or vomiting.  Continues on Jardiance 25 mg daily as well as metformin.  Is any rash.  Remains on acetaminophen being for zetia/lipitorl.   Denies any muscle aches or urinary color changes.   He has had 3 injections for proliferative retinopathy. Sees them again tomorrow.  He is due for urine microalbumin today.      Review of Systems   Constitutional:  Negative for chills, fatigue, fever, unexpected weight gain and unexpected weight loss.   HENT:  Negative for ear pain, postnasal drip, sinus pressure and sore throat.    Eyes:  Negative for blurred vision, double vision and visual disturbance.   Respiratory:  Negative for cough, shortness of breath and wheezing.    Cardiovascular:  Negative for chest pain, palpitations and leg swelling.   Gastrointestinal:  Negative for abdominal pain, blood in stool, diarrhea, nausea and vomiting.   Endocrine: Negative for cold intolerance, heat intolerance, polydipsia, polyphagia and polyuria.  "  Genitourinary:  Negative for dysuria, flank pain and hematuria.   Musculoskeletal:  Negative for arthralgias and joint swelling.   Skin:  Negative for dry skin and rash.   Neurological:  Negative for weakness, numbness and headache.   Psychiatric/Behavioral:  Negative for self-injury, suicidal ideas and depressed mood.        Vitals:    04/01/24 1536   BP: 122/78   Pulse: 87   SpO2: 97%   Weight: 79.1 kg (174 lb 6.4 oz)   Height: 177.8 cm (70\")     Body mass index is 25.02 kg/m².    Physical Exam  Vitals and nursing note reviewed.   Constitutional:       General: He is not in acute distress.     Appearance: He is well-developed. He is not diaphoretic.   HENT:      Head: Normocephalic and atraumatic.      Right Ear: External ear normal.      Left Ear: External ear normal.      Mouth/Throat:      Pharynx: No oropharyngeal exudate.   Eyes:      General: No scleral icterus.        Right eye: No discharge.      Conjunctiva/sclera: Conjunctivae normal.   Neck:      Thyroid: No thyromegaly.      Vascular: No JVD.      Trachea: No tracheal deviation.   Cardiovascular:      Rate and Rhythm: Normal rate and regular rhythm.      Heart sounds: Normal heart sounds.      Comments: PMI nondisplaced  Pulmonary:      Effort: Pulmonary effort is normal.      Breath sounds: Normal breath sounds. No wheezing or rales.   Abdominal:      General: Bowel sounds are normal.      Palpations: Abdomen is soft.      Tenderness: There is no abdominal tenderness. There is no guarding or rebound.   Musculoskeletal:      Cervical back: Normal range of motion and neck supple.   Lymphadenopathy:      Cervical: No cervical adenopathy.   Skin:     General: Skin is warm and dry.      Capillary Refill: Capillary refill takes less than 2 seconds.      Coloration: Skin is not pale.      Findings: No rash.   Neurological:      Mental Status: He is alert and oriented to person, place, and time.      Motor: No abnormal muscle tone.      Coordination: " Coordination normal.   Psychiatric:         Judgment: Judgment normal.         Procedures    Results Review:    I reviewed the patient's new clinical results.  ts 9 mo ago   Total Cholesterol  0 - 200 mg/dL 109   Triglycerides  0 - 150 mg/dL 66   HDL Cholesterol  40 - 60 mg/dL 51   LDL Cholesterol  0 - 100 mg/dL 44   VLDL Cholesterol  5 - 40 mg/dL 14   LDL/HDL Ratio 0.88    Result Notes       1  Topic          Component  Ref Range & Units 15:49 3 mo ago 6 mo ago 9 mo ago 1 yr ago   Hemoglobin A1C  4.5 - 5.7 % 6.3 Abnormal  7.5 Abnormal  8.4 R 7.4 R 7.9 R   Lot Number [id number]   Expiration Date 10/08/2025 07/30/2025 05/02/2025 02/26/2025 11/14/2024   Resulting Agency [facility]        Assessment/Plan:    Problem List Items Addressed This Visit       Type 2 diabetes mellitus with hyperglycemia, without long-term current use of insulin - Primary    Relevant Medications    empagliflozin (Jardiance) 25 MG tablet tablet    Dulaglutide (Trulicity) 4.5 MG/0.5ML solution pen-injector    Dulaglutide 3 MG/0.5ML solution pen-injector    metFORMIN ER (GLUCOPHAGE-XR) 500 MG 24 hr tablet    Other Relevant Orders    POC Glycosylated Hemoglobin (Hb A1C) (Completed)    MicroAlbumin, Urine, Random - Urine, Clean Catch    Hyperlipidemia    Relevant Medications    ezetimibe (ZETIA) 10 MG tablet    atorvastatin (LIPITOR) 20 MG tablet    Neuropathy    Relevant Medications    gabapentin (NEURONTIN) 600 MG tablet    Other Relevant Orders    MicroAlbumin, Urine, Random - Urine, Clean Catch   Kudos to him for improving his diabetes.  Holding current medications.  If evidence of microalbumin increases, we will place him on low-dose and ACE inhibitor.    Plan of care reviewed with patient at the conclusion of today's visit. Education was provided  regarding diagnosis and management.  Patient verbalizes understanding of and agreement with management plan.    Return in about 3 months (around 7/1/2024) for dm/lipid, Annual.    Nolberto Nicholson MD      Please note than portions of this note were completed wth a Voice Recognition Program

## 2024-04-02 LAB — ALBUMIN UR-MCNC: <1.2 MG/DL

## 2024-05-28 DIAGNOSIS — E11.65 TYPE 2 DIABETES MELLITUS WITH HYPERGLYCEMIA, WITHOUT LONG-TERM CURRENT USE OF INSULIN: Primary | ICD-10-CM

## 2024-05-28 RX ORDER — PROCHLORPERAZINE 25 MG/1
1 SUPPOSITORY RECTAL DAILY
Qty: 10 EACH | Refills: 6 | Status: SHIPPED | OUTPATIENT
Start: 2024-05-28

## 2024-05-28 RX ORDER — PROCHLORPERAZINE 25 MG/1
1 SUPPOSITORY RECTAL DAILY
Qty: 1 EACH | Refills: 1 | Status: SHIPPED | OUTPATIENT
Start: 2024-05-28

## 2024-05-28 RX ORDER — PROCHLORPERAZINE 25 MG/1
SUPPOSITORY RECTAL
Qty: 10 EACH | Refills: 1 | Status: SHIPPED | OUTPATIENT
Start: 2024-05-28

## 2024-06-20 DIAGNOSIS — F41.9 ANXIETY: ICD-10-CM

## 2024-06-20 RX ORDER — DIAZEPAM 5 MG/1
5 TABLET ORAL 2 TIMES DAILY PRN
Qty: 6 TABLET | Refills: 0 | Status: SHIPPED | OUTPATIENT
Start: 2024-06-20

## 2024-06-20 NOTE — TELEPHONE ENCOUNTER
Rx Refill Note  Requested Prescriptions     Pending Prescriptions Disp Refills    diazePAM (Valium) 5 MG tablet 6 tablet 0     Sig: Take 1 tablet by mouth 2 (Two) Times a Day As Needed for Anxiety.      Last office visit with prescribing clinician: 4/1/2024   Last telemedicine visit with prescribing clinician: Visit date not found   Next office visit with prescribing clinician: 7/8/2024                         Would you like a call back once the refill request has been completed: [] Yes [] No    If the office needs to give you a call back, can they leave a voicemail: [] Yes [] No    Ai Helton MA  06/20/24, 07:47 EDT

## 2024-06-24 DIAGNOSIS — G62.9 NEUROPATHY: ICD-10-CM

## 2024-06-25 NOTE — TELEPHONE ENCOUNTER
Rx Refill Note  Requested Prescriptions     Pending Prescriptions Disp Refills    gabapentin (NEURONTIN) 600 MG tablet 540 tablet 0     Sig: Take 2 tablets by mouth 3 (Three) Times a Day.      Last office visit with prescribing clinician: 4/1/2024   Last telemedicine visit with prescribing clinician: Visit date not found   Next office visit with prescribing clinician: 7/18/2024       Jeanette Alberto MA  06/25/24, 15:35 EDT

## 2024-06-26 RX ORDER — GABAPENTIN 600 MG/1
1200 TABLET ORAL 3 TIMES DAILY
Qty: 540 TABLET | Refills: 0 | Status: SHIPPED | OUTPATIENT
Start: 2024-06-26

## 2024-07-01 RX ORDER — EMPAGLIFLOZIN 25 MG/1
25 TABLET, FILM COATED ORAL DAILY
Qty: 90 TABLET | Refills: 0 | Status: SHIPPED | OUTPATIENT
Start: 2024-07-01

## 2024-07-18 ENCOUNTER — OFFICE VISIT (OUTPATIENT)
Dept: FAMILY MEDICINE CLINIC | Facility: CLINIC | Age: 64
End: 2024-07-18
Payer: COMMERCIAL

## 2024-07-18 VITALS
WEIGHT: 176 LBS | HEIGHT: 70 IN | DIASTOLIC BLOOD PRESSURE: 70 MMHG | HEART RATE: 75 BPM | BODY MASS INDEX: 25.2 KG/M2 | SYSTOLIC BLOOD PRESSURE: 122 MMHG | OXYGEN SATURATION: 99 %

## 2024-07-18 DIAGNOSIS — Z12.5 PROSTATE CANCER SCREENING: ICD-10-CM

## 2024-07-18 DIAGNOSIS — E78.5 HYPERLIPIDEMIA, UNSPECIFIED HYPERLIPIDEMIA TYPE: ICD-10-CM

## 2024-07-18 DIAGNOSIS — Z00.00 ANNUAL PHYSICAL EXAM: ICD-10-CM

## 2024-07-18 DIAGNOSIS — Z11.59 ENCOUNTER FOR HEPATITIS C SCREENING TEST FOR LOW RISK PATIENT: ICD-10-CM

## 2024-07-18 DIAGNOSIS — G62.9 NEUROPATHY: ICD-10-CM

## 2024-07-18 DIAGNOSIS — E11.65 TYPE 2 DIABETES MELLITUS WITH HYPERGLYCEMIA, WITHOUT LONG-TERM CURRENT USE OF INSULIN: Primary | ICD-10-CM

## 2024-07-18 LAB
EXPIRATION DATE: ABNORMAL
HBA1C MFR BLD: 6.6 % (ref 4.5–5.7)
Lab: ABNORMAL

## 2024-07-18 PROCEDURE — 83036 HEMOGLOBIN GLYCOSYLATED A1C: CPT | Performed by: INTERNAL MEDICINE

## 2024-07-18 PROCEDURE — 99396 PREV VISIT EST AGE 40-64: CPT | Performed by: INTERNAL MEDICINE

## 2024-07-18 PROCEDURE — 99214 OFFICE O/P EST MOD 30 MIN: CPT | Performed by: INTERNAL MEDICINE

## 2024-07-18 RX ORDER — METFORMIN HYDROCHLORIDE 500 MG/1
500 TABLET, EXTENDED RELEASE ORAL DAILY
Qty: 90 TABLET | Refills: 2 | Status: SHIPPED | OUTPATIENT
Start: 2024-07-18

## 2024-07-18 NOTE — ASSESSMENT & PLAN NOTE
Lipid abnormalities are stable    Plan:  Continue same medication/s without change.      Counseled patient on lifestyle modifications to help control hyperlipidemia.     Patient Treatment Goals:   LDL goal is under 100    Followup in 1 year.

## 2024-07-18 NOTE — PROGRESS NOTES
Yasmani Harper  1960  [id number]  Patient Care Team:  Nolberto Nicholson MD as PCP - General (Internal Medicine)  Vicente Van MD as Consulting Physician (Gastroenterology)    Yasmani Harper is a 63 y.o. male who is here today for his annual physical   This patient is accompanied by his self who contributes to the history of his care.    Chief Complaint:    Chief Complaint   Patient presents with    Annual Exam     Diabetes, lipids        History of Present Illness:     Here for annual exam.  He is current on his screenings.  Vaccinations are up-to-date.  He continues to exercise has lost down from 198 pounds when first visiting this office 176 today.  His weight has been as low as the mid 160s.  His fsbs have continued to be low ( 80 to 60) remains on only 500m g metformina daily with 3 mg trulicity.  He is asymptomatic with his hypoglycemia.  Denies any polyuria polydipsia.  Maintains strict currency with his ophthalmologist this has been-procedures on his retina.  Still with painful neuropathy which he takes gabapentin.  Occasional orthostasis despite being on no blood pressure medicines.  Has not had any syncope or palpitations.  Remains on Lipitor 20 mg daily as well as Zetia.  Denies any myalgias.  He is nonfasting today and will return for blood work.  Current with dentistry safety measures discussed.    Past Medical History:   Diagnosis Date    Arthritis Years    Cancer     Renal cell    Diabetes mellitus     Erectile dysfunction Years    History of kidney removal     HL (hearing loss) Years    Hyperlipidemia     Kidney stone 1980       Past Surgical History:   Procedure Laterality Date    ADENOIDECTOMY      COLONOSCOPY      CYST REMOVAL      NEPHRECTOMY      TONSILLECTOMY          Family History   Problem Relation Age of Onset    Cancer Father     Arthritis Maternal Grandmother        Social History     Socioeconomic History    Marital status:    Tobacco Use    Smoking status:  Never    Smokeless tobacco: Never   Vaping Use    Vaping status: Never Used   Substance and Sexual Activity    Alcohol use: Never    Drug use: Never    Sexual activity: Not Currently     Partners: Female     Birth control/protection: Vasectomy       Allergies   Allergen Reactions    Nsaids Other (See Comments)     Pt has 1 kidney unable to take NSAIDS        Depression: PHQ-2 Depression Screening  Little interest or pleasure in doing things? 0-->not at all   Feeling down, depressed, or hopeless? 0-->not at all   PHQ-2 Total Score 0      Immunization History   Administered Date(s) Administered    COVID-19 (MODERNA) 1st,2nd,3rd Dose Monovalent 08/16/2021, 09/13/2021    Flu Vaccine Quad PF >36MO 12/01/2016    Flublock Quad =>18yrs 09/14/2023    Pneumococcal Conjugate 20-Valent (PCV20) 06/21/2023    Pneumococcal Polysaccharide (PPSV23) 02/16/2017    Tdap 02/16/2017       Review of Systems:    Review of Systems   Constitutional:  Negative for chills, fatigue, fever, unexpected weight gain and unexpected weight loss.   HENT:  Negative for ear pain, postnasal drip, sinus pressure and sore throat.    Eyes:  Negative for blurred vision, double vision and visual disturbance.   Respiratory:  Negative for cough, shortness of breath and wheezing.    Cardiovascular:  Negative for chest pain, palpitations and leg swelling.   Gastrointestinal:  Negative for abdominal pain, blood in stool, diarrhea, nausea and vomiting.   Endocrine: Negative for cold intolerance, heat intolerance, polydipsia, polyphagia and polyuria.   Genitourinary:  Negative for dysuria, flank pain and hematuria.   Musculoskeletal:  Negative for arthralgias and joint swelling.   Skin:  Negative for dry skin and rash.   Neurological:  Negative for weakness, numbness and headache.        Burning foot pain   Psychiatric/Behavioral:  Negative for self-injury, suicidal ideas and depressed mood.        Vitals:    07/18/24 1546   BP: 122/70   Pulse: 75   SpO2: 99%  "  Weight: 79.8 kg (176 lb)   Height: 177.8 cm (70\")     Body mass index is 25.25 kg/m².      Current Outpatient Medications:     atorvastatin (LIPITOR) 20 MG tablet, Take 1 tablet by mouth Every Night., Disp: 90 tablet, Rfl: 3    Continuous Glucose Sensor (Dexcom G6 Sensor), Use Every 10 (Ten) Days., Disp: 10 each, Rfl: 1    diazePAM (Valium) 5 MG tablet, Take 1 tablet by mouth 2 (Two) Times a Day As Needed for Anxiety., Disp: 6 tablet, Rfl: 0    ezetimibe (ZETIA) 10 MG tablet, , Disp: , Rfl:     fluticasone (FLONASE) 50 MCG/ACT nasal spray, 2 sprays into the nostril(s) as directed by provider Daily., Disp: 5 mL, Rfl: 2    gabapentin (NEURONTIN) 600 MG tablet, Take 2 tablets by mouth 3 (Three) Times a Day., Disp: 540 tablet, Rfl: 0    metFORMIN ER (GLUCOPHAGE-XR) 500 MG 24 hr tablet, Take 1 tablet by mouth Daily., Disp: 90 tablet, Rfl: 2    Continuous Glucose  (Dexcom G6 ) device, Use 1 each Daily. Diagnosis:E11.9, Disp: 1 each, Rfl: 1    Continuous Glucose Transmitter (Dexcom G6 Transmitter) misc, Use 1 each Daily. Diagnosis:E11.9, Disp: 10 each, Rfl: 6    Dulaglutide 3 MG/0.5ML solution pen-injector, Inject 0.5 mL under the skin into the appropriate area as directed 1 (One) Time Per Week. (Patient not taking: Reported on 7/18/2024), Disp: 3 mL, Rfl: 9    Physical Exam:    Physical Exam  Vitals and nursing note reviewed.   Constitutional:       General: He is not in acute distress.     Appearance: He is well-developed. He is not diaphoretic.   HENT:      Head: Normocephalic and atraumatic.      Right Ear: External ear normal.      Left Ear: External ear normal.      Mouth/Throat:      Pharynx: No oropharyngeal exudate.   Eyes:      General: No scleral icterus.        Right eye: No discharge.      Conjunctiva/sclera: Conjunctivae normal.   Neck:      Thyroid: No thyromegaly.      Vascular: No JVD.      Trachea: No tracheal deviation.   Cardiovascular:      Rate and Rhythm: Normal rate and regular " rhythm.      Pulses:           Dorsalis pedis pulses are 2+ on the right side and 2+ on the left side.        Posterior tibial pulses are 2+ on the right side and 2+ on the left side.      Heart sounds: Normal heart sounds.      Comments: PMI nondisplaced  Pulmonary:      Effort: Pulmonary effort is normal.      Breath sounds: Normal breath sounds. No wheezing or rales.   Abdominal:      General: Bowel sounds are normal.      Palpations: Abdomen is soft.      Tenderness: There is no abdominal tenderness. There is no guarding or rebound.   Musculoskeletal:      Cervical back: Normal range of motion and neck supple.   Feet:      Right foot:      Protective Sensation: 5 sites tested.  2 sites sensed.      Skin integrity: Callus and dry skin present.      Toenail Condition: Right toenails are long.      Left foot:      Protective Sensation: 5 sites tested.  2 sites sensed.      Skin integrity: Callus and dry skin present.      Toenail Condition: Left toenails are long.      Comments:   both great toes have hard calluses skin is dry.  Nails are in good repair however needs trimming    Lymphadenopathy:      Cervical: No cervical adenopathy.   Skin:     General: Skin is warm and dry.      Capillary Refill: Capillary refill takes less than 2 seconds.      Coloration: Skin is not pale.      Findings: No rash.   Neurological:      Mental Status: He is alert and oriented to person, place, and time.      Motor: No abnormal muscle tone.      Coordination: Coordination normal.   Psychiatric:         Mood and Affect: Mood normal.         Behavior: Behavior normal.         Judgment: Judgment normal.         Procedures    Results Review:    I reviewed the patient's new clinical results.  Globin A1c stable at 6.6    Assessment/Plan:    Problem List Items Addressed This Visit       Type 2 diabetes mellitus with hyperglycemia, without long-term current use of insulin - Primary    Current Assessment & Plan     Diabetes is improving with  treatment.   Medication changes per orders.  Recommended a Mediterranean style of eating  Regular aerobic exercise.  Discussed sick day management.  Discussed foot care.  Reminded to get yearly retinal exam.  Recommended podiatry visit for callus paring  Diabetes will be reassessed in 3 months I would decrease his metformin to once daily 500 mg continued Trulicity.  If hypoglycemia still persist, discontinue metformin altogether.         Relevant Medications    Dulaglutide 3 MG/0.5ML solution pen-injector    Continuous Glucose Transmitter (Dexcom G6 Transmitter) misc    Continuous Glucose  (Dexcom G6 ) device    Continuous Glucose Sensor (Dexcom G6 Sensor)    metFORMIN ER (GLUCOPHAGE-XR) 500 MG 24 hr tablet    Other Relevant Orders    POC Glycosylated Hemoglobin (Hb A1C) (Completed)    TSH Rfx On Abnormal To Free T4    Hyperlipidemia    Current Assessment & Plan      Lipid abnormalities are stable    Plan:  Continue same medication/s without change.      Counseled patient on lifestyle modifications to help control hyperlipidemia.     Patient Treatment Goals:   LDL goal is under 100    Followup in 1 year.         Relevant Medications    ezetimibe (ZETIA) 10 MG tablet    atorvastatin (LIPITOR) 20 MG tablet    Other Relevant Orders    Lipid Panel    CBC (No Diff)    Neuropathy    Current Assessment & Plan     Symptoms markedly improved on gabapentin.  No refills needed.         Relevant Medications    gabapentin (NEURONTIN) 600 MG tablet    Annual physical exam     Other Visit Diagnoses       Prostate cancer screening        Relevant Orders    PSA Screen    Encounter for hepatitis C screening test for low risk patient        Relevant Orders    Hepatitis C antibody            Plan of care was reviewed with patient at the conclusion of today's visit. Counseled patient with regards to good nutrition and diet. Maintaining a healthy lifestyle including exercise and physical activities. Spoke with patient on  ways to reduce stress, getting adequate sleep and injury prevention.  Discussed prostate cancer screening, colon cancer screening including benefit of early detection and potential need for follow-up. Patient agrees to screenings today. Annual dental and eye exams were encouraged. Encouraged patient to continue to follow up with annual immunizations.     Return in about 3 months (around 10/18/2024) for dm.    Nolberto Nicholson MD      Please note than portions of this note were completed wth a Voice Recognition Program

## 2024-07-18 NOTE — ASSESSMENT & PLAN NOTE
Diabetes is improving with treatment.   Medication changes per orders.  Recommended a Mediterranean style of eating  Regular aerobic exercise.  Discussed sick day management.  Discussed foot care.  Reminded to get yearly retinal exam.  Recommended podiatry visit for callus paring  Diabetes will be reassessed in 3 months I would decrease his metformin to once daily 500 mg continued Trulicity.  If hypoglycemia still persist, discontinue metformin altogether.

## 2024-07-21 ENCOUNTER — LAB (OUTPATIENT)
Dept: LAB | Facility: HOSPITAL | Age: 64
End: 2024-07-21
Payer: COMMERCIAL

## 2024-07-21 DIAGNOSIS — Z11.59 ENCOUNTER FOR HEPATITIS C SCREENING TEST FOR LOW RISK PATIENT: ICD-10-CM

## 2024-07-21 DIAGNOSIS — E11.65 TYPE 2 DIABETES MELLITUS WITH HYPERGLYCEMIA, WITHOUT LONG-TERM CURRENT USE OF INSULIN: ICD-10-CM

## 2024-07-21 DIAGNOSIS — E78.5 HYPERLIPIDEMIA, UNSPECIFIED HYPERLIPIDEMIA TYPE: ICD-10-CM

## 2024-07-21 DIAGNOSIS — Z12.5 PROSTATE CANCER SCREENING: ICD-10-CM

## 2024-07-21 LAB
CHOLEST SERPL-MCNC: 108 MG/DL (ref 0–200)
DEPRECATED RDW RBC AUTO: 42.5 FL (ref 37–54)
ERYTHROCYTE [DISTWIDTH] IN BLOOD BY AUTOMATED COUNT: 12.3 % (ref 12.3–15.4)
HCT VFR BLD AUTO: 43.6 % (ref 37.5–51)
HCV AB SER QL: NORMAL
HDLC SERPL-MCNC: 54 MG/DL (ref 40–60)
HGB BLD-MCNC: 14.6 G/DL (ref 13–17.7)
LDLC SERPL CALC-MCNC: 39 MG/DL (ref 0–100)
LDLC/HDLC SERPL: 0.74 {RATIO}
MCH RBC QN AUTO: 31.5 PG (ref 26.6–33)
MCHC RBC AUTO-ENTMCNC: 33.5 G/DL (ref 31.5–35.7)
MCV RBC AUTO: 94 FL (ref 79–97)
PLATELET # BLD AUTO: 233 10*3/MM3 (ref 140–450)
PMV BLD AUTO: 9.4 FL (ref 6–12)
PSA SERPL-MCNC: 0.79 NG/ML (ref 0–4)
RBC # BLD AUTO: 4.64 10*6/MM3 (ref 4.14–5.8)
TRIGL SERPL-MCNC: 69 MG/DL (ref 0–150)
TSH SERPL DL<=0.05 MIU/L-ACNC: 1.05 UIU/ML (ref 0.27–4.2)
VLDLC SERPL-MCNC: 15 MG/DL (ref 5–40)
WBC NRBC COR # BLD AUTO: 6.3 10*3/MM3 (ref 3.4–10.8)

## 2024-07-21 PROCEDURE — 85027 COMPLETE CBC AUTOMATED: CPT

## 2024-07-21 PROCEDURE — 80061 LIPID PANEL: CPT

## 2024-07-21 PROCEDURE — 86803 HEPATITIS C AB TEST: CPT

## 2024-07-21 PROCEDURE — 36415 COLL VENOUS BLD VENIPUNCTURE: CPT

## 2024-07-21 PROCEDURE — G0103 PSA SCREENING: HCPCS

## 2024-07-21 PROCEDURE — 84443 ASSAY THYROID STIM HORMONE: CPT

## 2024-08-13 RX ORDER — ATORVASTATIN CALCIUM 20 MG/1
20 TABLET, FILM COATED ORAL NIGHTLY
Qty: 90 TABLET | Refills: 3 | Status: SHIPPED | OUTPATIENT
Start: 2024-08-13

## 2024-08-13 NOTE — TELEPHONE ENCOUNTER
Rx Refill Note  Requested Prescriptions     Pending Prescriptions Disp Refills    atorvastatin (LIPITOR) 20 MG tablet [Pharmacy Med Name: ATORVASTATIN 20 MG TABLET] 90 tablet 3     Sig: TAKE ONE TABLET BY MOUTH ONCE NIGHTLY      Last office visit with prescribing clinician: 7/18/2024   Last telemedicine visit with prescribing clinician: Visit date not found   Next office visit with prescribing clinician: 10/18/2024                         Would you like a call back once the refill request has been completed: [] Yes [] No    If the office needs to give you a call back, can they leave a voicemail: [] Yes [] No    Paige Flores MA  08/13/24, 13:20 EDT

## 2024-10-18 ENCOUNTER — OFFICE VISIT (OUTPATIENT)
Dept: FAMILY MEDICINE CLINIC | Facility: CLINIC | Age: 64
End: 2024-10-18
Payer: COMMERCIAL

## 2024-10-18 VITALS
SYSTOLIC BLOOD PRESSURE: 124 MMHG | BODY MASS INDEX: 27.43 KG/M2 | HEIGHT: 70 IN | WEIGHT: 191.6 LBS | DIASTOLIC BLOOD PRESSURE: 78 MMHG

## 2024-10-18 DIAGNOSIS — F41.9 ANXIETY: ICD-10-CM

## 2024-10-18 DIAGNOSIS — G62.9 NEUROPATHY: ICD-10-CM

## 2024-10-18 DIAGNOSIS — Z51.81 THERAPEUTIC DRUG MONITORING: Primary | ICD-10-CM

## 2024-10-18 DIAGNOSIS — E11.65 TYPE 2 DIABETES MELLITUS WITH HYPERGLYCEMIA, WITHOUT LONG-TERM CURRENT USE OF INSULIN: ICD-10-CM

## 2024-10-18 PROBLEM — E11.3553 STABLE PROLIFERATIVE DIABETIC RETINOPATHY OF BOTH EYES ASSOCIATED WITH TYPE 2 DIABETES MELLITUS: Status: ACTIVE | Noted: 2024-10-18

## 2024-10-18 LAB
EXPIRATION DATE: ABNORMAL
HBA1C MFR BLD: 8.2 % (ref 4.5–5.7)
Lab: ABNORMAL

## 2024-10-18 PROCEDURE — 83036 HEMOGLOBIN GLYCOSYLATED A1C: CPT | Performed by: INTERNAL MEDICINE

## 2024-10-18 PROCEDURE — 99214 OFFICE O/P EST MOD 30 MIN: CPT | Performed by: INTERNAL MEDICINE

## 2024-10-18 RX ORDER — KETOROLAC TROMETHAMINE 30 MG/ML
1 INJECTION, SOLUTION INTRAMUSCULAR; INTRAVENOUS DAILY
Qty: 1 EACH | Refills: 11 | Status: SHIPPED | OUTPATIENT
Start: 2024-10-18

## 2024-10-18 RX ORDER — DULAGLUTIDE 4.5 MG/.5ML
4.5 INJECTION, SOLUTION SUBCUTANEOUS WEEKLY
Qty: 3 ML | Refills: 2 | Status: SHIPPED | OUTPATIENT
Start: 2024-10-18

## 2024-10-18 RX ORDER — DIAZEPAM 5 MG
5 TABLET ORAL 2 TIMES DAILY PRN
Qty: 6 TABLET | Refills: 2 | Status: SHIPPED | OUTPATIENT
Start: 2024-10-18

## 2024-10-18 RX ORDER — BLOOD-GLUCOSE SENSOR
1 EACH MISCELLANEOUS
Qty: 10 EACH | Refills: 9 | Status: SHIPPED | OUTPATIENT
Start: 2024-10-18

## 2024-10-18 RX ORDER — GABAPENTIN 600 MG/1
1200 TABLET ORAL 3 TIMES DAILY
Qty: 540 TABLET | Refills: 0 | Status: SHIPPED | OUTPATIENT
Start: 2024-10-18

## 2024-10-18 NOTE — ASSESSMENT & PLAN NOTE
Diabetes is worsening.   Medication changes per orders.  Recommended a Mediterranean style of eating  Regular aerobic exercise.  Discussed ways to avoid symptomatic hypoglycemia.  Discussed foot care.  Reminded to get yearly retinal exam.  Diabetes will be reassessed in 3 months

## 2024-10-18 NOTE — ASSESSMENT & PLAN NOTE
Symptoms markedly improved on gabapentin.  Refills authorized on gabapentin.  U[place] PDMP reviewed CSA on file

## 2024-10-18 NOTE — PROGRESS NOTES
Yasmani Harper  1960  [id number]  Patient Care Team:  Nolberto Nicholson MD as PCP - General (Internal Medicine)  Vicente Van MD as Consulting Physician (Gastroenterology)    Yasmani Harper is a 64 y.o. male here today for follow up.     This patient is accompanied by their self who contributes to the history of their care.    Chief Complaint:    Chief Complaint   Patient presents with    Diabetes        History of Present Illness:  I have reviewed and/or updated the patient's past medical, past surgical, family, social history, problem list and allergies as appropriate.     Still getting Left retinal injections about every 2 weeks. Seems to be stabilizing. He has gained weight. Still on trulicity 3 mg  with on e metformin daily.  Still with painful neuropathy requiring high dose gabapentin. He does need a reflii.  Continues on zetia and lipitorfor lipids control as well  He has noted no allodynia since returning to Trulicity.  Was highly intolerant of Ozempic.  Denies any muscle aches or urinary color changes on Lipitor  Review of Systems   Constitutional:  Negative for chills, fatigue, fever, unexpected weight gain and unexpected weight loss.   HENT:  Negative for ear pain, postnasal drip, sinus pressure and sore throat.    Eyes:  Negative for blurred vision, double vision and visual disturbance.   Respiratory:  Negative for cough, shortness of breath and wheezing.    Cardiovascular:  Negative for chest pain, palpitations and leg swelling.   Gastrointestinal:  Negative for abdominal pain, blood in stool, diarrhea, nausea and vomiting.   Endocrine: Negative for cold intolerance, heat intolerance, polydipsia, polyphagia and polyuria.   Genitourinary:  Negative for dysuria, flank pain and hematuria.   Musculoskeletal:  Negative for arthralgias and joint swelling.   Skin:  Negative for dry skin and rash.   Neurological:  Negative for weakness, numbness and headache.   Psychiatric/Behavioral:   "Negative for self-injury, suicidal ideas and depressed mood.        Vitals:    10/18/24 1527   BP: 124/78   Weight: 86.9 kg (191 lb 9.6 oz)   Height: 177.8 cm (70\")     Body mass index is 27.49 kg/m².    Physical Exam  Vitals reviewed.   Constitutional:       Appearance: He is well-developed.   HENT:      Head: Normocephalic and atraumatic.   Eyes:      Conjunctiva/sclera: Conjunctivae normal.   Cardiovascular:      Rate and Rhythm: Normal rate and regular rhythm.      Heart sounds: Normal heart sounds.   Pulmonary:      Effort: Pulmonary effort is normal.      Breath sounds: Normal breath sounds.   Abdominal:      Palpations: Abdomen is soft.   Musculoskeletal:         General: Normal range of motion.      Cervical back: Normal range of motion and neck supple.   Skin:     General: Skin is warm and dry.      Capillary Refill: Capillary refill takes less than 2 seconds.   Neurological:      Mental Status: He is alert and oriented to person, place, and time.      Sensory: No sensory deficit.      Deep Tendon Reflexes: Reflexes normal.   Psychiatric:         Mood and Affect: Mood normal.         Behavior: Behavior normal.         Procedures    Results Review:    I reviewed the patient's new clinical results. Hba1c 7.5     Assessment/Plan:    Problem List Items Addressed This Visit       Type 2 diabetes mellitus with hyperglycemia, without long-term current use of insulin    Current Assessment & Plan     Diabetes is worsening.   Medication changes per orders.  Recommended a Mediterranean style of eating  Regular aerobic exercise.  Discussed ways to avoid symptomatic hypoglycemia.  Discussed foot care.  Reminded to get yearly retinal exam.  Diabetes will be reassessed in 3 months         Relevant Medications    Continuous Glucose Transmitter (Dexcom G6 Transmitter) misc    Continuous Glucose  (Dexcom G6 ) device    metFORMIN ER (GLUCOPHAGE-XR) 500 MG 24 hr tablet    Dulaglutide (Trulicity) 4.5 MG/0.5ML " solution pen-injector    Other Relevant Orders    POC Glycosylated Hemoglobin (Hb A1C) (Completed)    Neuropathy    Current Assessment & Plan     Symptoms markedly improved on gabapentin.  Refills authorized on gabapentin.  [place] PDMP reviewed CSA on file         Relevant Medications    gabapentin (NEURONTIN) 600 MG tablet    Dulaglutide (Trulicity) 4.5 MG/0.5ML solution pen-injector    Continuous Glucose Sensor (FreeStyle Ria 3 Sensor) misc    Continuous Glucose  (FreeStyle Ria 3 [place]) device     Other Visit Diagnoses       Therapeutic drug monitoring    -  Primary    Relevant Orders    Compliance Drug Analysis, Ur - Urine, Clean Catch    Anxiety        Relevant Medications    diazePAM (Valium) 5 MG tablet            Plan of care reviewed with patient at the conclusion of today's visit. Education was provided regarding diagnosis and management.  Patient verbalizes understanding of and agreement with management plan.    Return in about 3 months (around 1/18/2025) for dm.    Nolberto Nicholson MD      Please note than portions of this note were completed wth a Voice Recognition Program          Answers submitted by the patient for this visit:  Other (Submitted on 10/17/2024)  Please describe your symptoms.: None. It's a checkup  Have you had these symptoms before?: Yes  How long have you been having these symptoms?: Greater than 2 weeks  Primary Reason for Visit (Submitted on 10/17/2024)  What is the primary reason for your visit?: Problem Not Listed

## 2024-10-18 NOTE — ASSESSMENT & PLAN NOTE
Lipid abnormalities are stable     Plan:  Continue same medication/s without change.       Counseled patient on lifestyle modifications to help control hyperlipidemia.      Patient Treatment Goals:   LDL goal is under 100     Followup in 6 mon

## 2024-10-22 DIAGNOSIS — E11.65 TYPE 2 DIABETES MELLITUS WITH HYPERGLYCEMIA, WITHOUT LONG-TERM CURRENT USE OF INSULIN: Primary | ICD-10-CM

## 2024-10-22 RX ORDER — BLOOD-GLUCOSE SENSOR
EACH MISCELLANEOUS DAILY
Qty: 2 EACH | Refills: 11 | Status: SHIPPED | OUTPATIENT
Start: 2024-10-22

## 2024-10-29 LAB — DRUGS UR: NORMAL

## 2024-12-26 RX ORDER — METFORMIN HYDROCHLORIDE 500 MG/1
1000 TABLET, EXTENDED RELEASE ORAL 2 TIMES DAILY
Qty: 180 TABLET | Refills: 2 | Status: SHIPPED | OUTPATIENT
Start: 2024-12-26

## 2024-12-26 NOTE — TELEPHONE ENCOUNTER
Rx Refill Note  Requested Prescriptions     Pending Prescriptions Disp Refills    metFORMIN ER (GLUCOPHAGE-XR) 500 MG 24 hr tablet [Pharmacy Med Name: METFORMIN HCL  MG TABLET] 180 tablet      Sig: TAKE 2 TABLETS BY MOUTH TWICE A DAY      Last office visit with prescribing clinician: 10/18/2024   Last telemedicine visit with prescribing clinician: Visit date not found   Next office visit with prescribing clinician: 1/16/2025                         Would you like a call back once the refill request has been completed: [] Yes [] No    If the office needs to give you a call back, can they leave a voicemail: [] Yes [] No    Paige Flores MA  12/26/24, 11:26 EST

## 2025-01-14 DIAGNOSIS — G62.9 NEUROPATHY: ICD-10-CM

## 2025-01-14 RX ORDER — DULAGLUTIDE 4.5 MG/.5ML
INJECTION, SOLUTION SUBCUTANEOUS
Qty: 2 ML | Refills: 2 | Status: SHIPPED | OUTPATIENT
Start: 2025-01-14 | End: 2025-01-16

## 2025-01-14 NOTE — TELEPHONE ENCOUNTER
Rx Refill Note  Requested Prescriptions     Pending Prescriptions Disp Refills    Trulicity 4.5 MG/0.5ML solution auto-injector [Pharmacy Med Name: TRULICITY 4.5 MG/0.5 ML PEN] 2 mL      Sig: INJECT 4.5 MG UNDER THE SKIN ONCE WEEKLY      Last office visit with prescribing clinician: 10/18/2024   Last telemedicine visit with prescribing clinician: Visit date not found   Next office visit with prescribing clinician: 1/16/2025                         Would you like a call back once the refill request has been completed: [] Yes [] No    If the office needs to give you a call back, can they leave a voicemail: [] Yes [] No    Paige Flores MA  01/14/25, 09:04 EST

## 2025-01-16 ENCOUNTER — PRIOR AUTHORIZATION (OUTPATIENT)
Dept: FAMILY MEDICINE CLINIC | Facility: CLINIC | Age: 65
End: 2025-01-16

## 2025-01-16 ENCOUNTER — OFFICE VISIT (OUTPATIENT)
Dept: FAMILY MEDICINE CLINIC | Facility: CLINIC | Age: 65
End: 2025-01-16
Payer: COMMERCIAL

## 2025-01-16 VITALS
DIASTOLIC BLOOD PRESSURE: 75 MMHG | HEART RATE: 82 BPM | SYSTOLIC BLOOD PRESSURE: 125 MMHG | BODY MASS INDEX: 28.26 KG/M2 | OXYGEN SATURATION: 98 % | HEIGHT: 70 IN | WEIGHT: 197.4 LBS

## 2025-01-16 DIAGNOSIS — E78.5 HYPERLIPIDEMIA, UNSPECIFIED HYPERLIPIDEMIA TYPE: ICD-10-CM

## 2025-01-16 DIAGNOSIS — E11.65 TYPE 2 DIABETES MELLITUS WITH HYPERGLYCEMIA, WITHOUT LONG-TERM CURRENT USE OF INSULIN: Primary | ICD-10-CM

## 2025-01-16 DIAGNOSIS — G62.9 NEUROPATHY: ICD-10-CM

## 2025-01-16 LAB
EXPIRATION DATE: ABNORMAL
HBA1C MFR BLD: 7.4 % (ref 4.5–5.7)
Lab: ABNORMAL

## 2025-01-16 PROCEDURE — 83036 HEMOGLOBIN GLYCOSYLATED A1C: CPT | Performed by: INTERNAL MEDICINE

## 2025-01-16 PROCEDURE — 99214 OFFICE O/P EST MOD 30 MIN: CPT | Performed by: INTERNAL MEDICINE

## 2025-01-16 RX ORDER — GABAPENTIN 600 MG/1
1200 TABLET ORAL 3 TIMES DAILY
Qty: 540 TABLET | Refills: 0 | Status: SHIPPED | OUTPATIENT
Start: 2025-01-16

## 2025-01-16 NOTE — TELEPHONE ENCOUNTER
(Key: [id number]) - [id number]  Trulicity 4.5MG/0.5ML auto-injectors  status: PA Response - ApprovedCreated: January 14th, 2025 [phone]: January 16th, 2025    Approved today by [facility] 2017  Your PA request has been approved. Additional information will be provided in the approval communication. (Message 1145)  Authorization Expiration Date: 1/16/2026

## 2025-01-16 NOTE — PROGRESS NOTES
"Yasmani Harper  1960  [id number]  Patient Care Team:  Nolberto Nicholson MD as PCP - General (Internal Medicine)  Vicente Van MD as Consulting Physician (Gastroenterology)    Yasmani Harper is a 64 y.o. male here today for follow up.     This patient is accompanied by their self who contributes to the history of their care.    Chief Complaint:    Chief Complaint   Patient presents with    Diabetes        History of Present Illness:  I have reviewed and/or updated the patient's past medical, past surgical, family, social history, problem list and allergies as appropriate.     Rentiopathy has improved. Still getting injections down to every 8 weeks. Remians on trulicity 4.5 - occasionally cannot feel needle- and occasional fluid. He has gained a little weight- questioning whether he is getting the full dose. Exercising none and eating the same.  No hypoglycemic episodes.  His gabapentin still seems to be holding his neuropathy.  He remains on 1200 mg 3 times daily.  He is down to 0 refills.  It is encouraging to note that his proliferative diabetic retinopathy is improving with injections.  We have been providing him with as needed diazepam pretreatment to get him to those appointments.  He is not in need of a refill.  Lipids are controlled with atorvastatin 20 mg daily and Zetia.  He has recently had a co-Q10 as well.    Mag, coq10, tumeric has been taken daily  Review of Systems   Constitutional:  Negative for chills, diaphoresis, fatigue and fever.   HENT:  Negative for congestion, sore throat and swollen glands.    Respiratory:  Negative for cough.    Cardiovascular:  Negative for chest pain.   Genitourinary:  Negative for dysuria.   Musculoskeletal:  Negative for myalgias and neck pain.   Skin:  Negative for rash.   Neurological:  Negative for weakness and numbness.       Vitals:    01/16/25 1519   BP: 125/75   Pulse: 82   SpO2: 98%   Weight: 89.5 kg (197 lb 6.4 oz)   Height: 177.8 cm (70\") "     Body mass index is 28.32 kg/m².    Physical Exam  Vitals and nursing note reviewed.   Constitutional:       General: He is not in acute distress.     Appearance: He is well-developed. He is not diaphoretic.   HENT:      Head: Normocephalic and atraumatic.      Right Ear: External ear normal.      Left Ear: External ear normal.      Mouth/Throat:      Pharynx: No oropharyngeal exudate.   Eyes:      General: No scleral icterus.        Right eye: No discharge.      Conjunctiva/sclera: Conjunctivae normal.   Neck:      Thyroid: No thyromegaly.      Vascular: No JVD.      Trachea: No tracheal deviation.   Cardiovascular:      Rate and Rhythm: Normal rate and regular rhythm.      Heart sounds: Normal heart sounds.      Comments: PMI nondisplaced  Pulmonary:      Effort: Pulmonary effort is normal.      Breath sounds: Normal breath sounds. No wheezing or rales.   Abdominal:      General: Bowel sounds are normal.      Palpations: Abdomen is soft.      Tenderness: There is no abdominal tenderness. There is no guarding or rebound.   Musculoskeletal:      Cervical back: Normal range of motion and neck supple.   Lymphadenopathy:      Cervical: No cervical adenopathy.   Skin:     General: Skin is warm and dry.      Capillary Refill: Capillary refill takes less than 2 seconds.      Coloration: Skin is not pale.      Findings: No rash.   Neurological:      Mental Status: He is alert and oriented to person, place, and time.      Motor: No abnormal muscle tone.      Coordination: Coordination normal.   Psychiatric:         Judgment: Judgment normal.         Procedures    Results Review:    I reviewed the patient's new clinical results.    Assessment/Plan:    Problem List Items Addressed This Visit       Type 2 diabetes mellitus with hyperglycemia, without long-term current use of insulin - Primary    Current Assessment & Plan     Diabetes is worsening.  On maximal dose of Trulicity and metformin.  Have discontinued Trulicity and  will seek approval for Mounjaro.  Have sent in a prescription for 2.5 mg daily.  She develop any allodynia or other side effects he will notify me immediately.  Medication changes per orders.  Recommended a Mediterranean style of eating  Regular aerobic exercise.  Discussed ways to avoid symptomatic hypoglycemia.  Discussed foot care.  Reminded to get yearly retinal exam.  Diabetes will be reassessed in 3 months         Relevant Medications    Continuous Glucose Sensor (FreeStyle Ria 3 Plus Sensor)    metFORMIN ER (GLUCOPHAGE-XR) 500 MG 24 hr tablet    Tirzepatide 2.5 MG/0.5ML solution auto-injector    Other Relevant Orders    POC Glycosylated Hemoglobin (Hb A1C) (Completed)    Hyperlipidemia    Relevant Medications    ezetimibe (ZETIA) 10 MG tablet    atorvastatin (LIPITOR) 20 MG tablet    Neuropathy    Current Assessment & Plan     Symptoms markedly improved on gabapentin.  Refills authorized on gabapentin.  [place] PDMP reviewed CSA on file          Relevant Medications    Continuous Glucose  (FreeStyle Ria 3 [place]) device    gabapentin (NEURONTIN) 600 MG tablet       Plan of care reviewed with patient at the conclusion of today's visit. Education was provided regarding diagnosis and management.  Patient verbalizes understanding of and agreement with management plan.    Return in about 3 months (around 4/16/2025) for dm.    Nolberto Nicholson MD      Please note than portions of this note were completed wth a Voice Recognition Program          Answers submitted by the patient for this visit:  Primary Reason for Visit (Submitted on 1/15/2025)  What is the primary reason for your visit?: Problem Not Listed  Problem not listed (Submitted on 1/15/2025)  Chief Complaint: Other medical problem  Reason for appointment: Check up  anorexia: No  joint pain: No  change in stool: No  headaches: No  joint swelling: No  vertigo: No  visual change: No  Onset: at an unknown time

## 2025-01-16 NOTE — ASSESSMENT & PLAN NOTE
Diabetes is worsening.  On maximal dose of Trulicity and metformin.  Have discontinued Trulicity and will seek approval for Mounjaro.  Have sent in a prescription for 2.5 mg daily.  She develop any allodynia or other side effects he will notify me immediately.  Medication changes per orders.  Recommended a Mediterranean style of eating  Regular aerobic exercise.  Discussed ways to avoid symptomatic hypoglycemia.  Discussed foot care.  Reminded to get yearly retinal exam.  Diabetes will be reassessed in 3 months

## 2025-01-17 ENCOUNTER — PRIOR AUTHORIZATION (OUTPATIENT)
Dept: FAMILY MEDICINE CLINIC | Facility: CLINIC | Age: 65
End: 2025-01-17
Payer: COMMERCIAL

## 2025-01-17 NOTE — TELEPHONE ENCOUNTER
Key: [id number] - [id number]  Mounjaro 2.5MG/0.5ML auto-injectors  status: PA Response - Approved  Created: January 16th, 2025 [phone]  Sent: January 17th, 2025    The authorization is valid from 12/18/2024 through 01/17/2026

## 2025-02-22 ENCOUNTER — DOCUMENTATION (OUTPATIENT)
Dept: FAMILY MEDICINE CLINIC | Facility: CLINIC | Age: 65
End: 2025-02-22
Payer: COMMERCIAL

## 2025-02-22 DIAGNOSIS — E11.65 TYPE 2 DIABETES MELLITUS WITH HYPERGLYCEMIA, WITHOUT LONG-TERM CURRENT USE OF INSULIN: Primary | ICD-10-CM

## 2025-03-25 NOTE — ASSESSMENT & PLAN NOTE
Currently with gabapentin at 1200 mg po tid   No - the patient is unable to be screened due to medical condition

## 2025-04-18 ENCOUNTER — OFFICE VISIT (OUTPATIENT)
Dept: FAMILY MEDICINE CLINIC | Facility: CLINIC | Age: 65
End: 2025-04-18
Payer: COMMERCIAL

## 2025-04-18 VITALS
BODY MASS INDEX: 27.06 KG/M2 | HEART RATE: 78 BPM | WEIGHT: 189 LBS | HEIGHT: 70 IN | DIASTOLIC BLOOD PRESSURE: 78 MMHG | OXYGEN SATURATION: 96 % | SYSTOLIC BLOOD PRESSURE: 120 MMHG

## 2025-04-18 DIAGNOSIS — Z12.11 COLON CANCER SCREENING: ICD-10-CM

## 2025-04-18 DIAGNOSIS — E11.65 TYPE 2 DIABETES MELLITUS WITH HYPERGLYCEMIA, WITHOUT LONG-TERM CURRENT USE OF INSULIN: Primary | ICD-10-CM

## 2025-04-18 DIAGNOSIS — E78.5 HYPERLIPIDEMIA, UNSPECIFIED HYPERLIPIDEMIA TYPE: ICD-10-CM

## 2025-04-18 DIAGNOSIS — F41.9 ANXIETY: ICD-10-CM

## 2025-04-18 DIAGNOSIS — Z90.5 SINGLE KIDNEY: ICD-10-CM

## 2025-04-18 DIAGNOSIS — E11.3553 STABLE PROLIFERATIVE DIABETIC RETINOPATHY OF BOTH EYES ASSOCIATED WITH TYPE 2 DIABETES MELLITUS: ICD-10-CM

## 2025-04-18 DIAGNOSIS — G62.9 NEUROPATHY: ICD-10-CM

## 2025-04-18 LAB
EXPIRATION DATE: ABNORMAL
HBA1C MFR BLD: 6.9 % (ref 4.5–5.7)
Lab: ABNORMAL

## 2025-04-18 PROCEDURE — 82043 UR ALBUMIN QUANTITATIVE: CPT | Performed by: INTERNAL MEDICINE

## 2025-04-18 PROCEDURE — 82570 ASSAY OF URINE CREATININE: CPT | Performed by: INTERNAL MEDICINE

## 2025-04-18 RX ORDER — GABAPENTIN 600 MG/1
1200 TABLET ORAL 3 TIMES DAILY
Qty: 540 TABLET | Refills: 0 | Status: SHIPPED | OUTPATIENT
Start: 2025-04-18

## 2025-04-18 RX ORDER — DIAZEPAM 5 MG/1
5 TABLET ORAL 2 TIMES DAILY PRN
Qty: 6 TABLET | Refills: 2 | Status: SHIPPED | OUTPATIENT
Start: 2025-04-18

## 2025-04-18 NOTE — PROGRESS NOTES
Answers submitted by the patient for this visit:  Diabetes Questionnaire (Submitted on 4/11/2025)  Chief Complaint: Diabetes problem  Diabetes type: type 2  MedicAlert ID: No  Disease duration: 20 Years  Treatment compliance: some of the time  Symptom course: stable  Home blood tests: 1-2 x per week  High score: 180-200  Below 70: never  foot paresthesias: Yes  foot ulcerations: No  blackouts: No  hospitalization: No  nocturnal hypoglycemia: No  required assistance: No  required glucagon: No  headaches: No  sweats: No  Current diet: high fat/cholesterol  Meal planning: none  Exercise: intermittently  Eye exam current: Yes  Sees podiatrist: No  Yasmani Harper  1960  [id number]  Patient Care Team:  Nolberto Nicholson MD as PCP - General (Internal Medicine)  Vicente Van MD as Consulting Physician (Gastroenterology)    Yasmani Harper is a 64 y.o. male here today for annual exam.  This patient is accompanied by their self who contributes to the history of their care.    Chief Complaint:    Chief Complaint   Patient presents with    Diabetes       History of Present Illness:   He is here to follow-up on his diabetes, dyslipidemia.  He also has significant diabetic neuropathy as well as  proliferative retinopathy.. Rentiopathy has improved. Still getting injections down to every 8 weeks. Remians on Mounjaro - occasionally cannot feel needle- and occasional fluid. He has gained a little weight- questioning whether he is getting the full dose. Exercising none and eating the same.  No hypoglycemic episodes.  His gabapentin still seems to be holding his neuropathy.  He remains on 1200 mg 3 times daily.    It is encouraging to note that his proliferative diabetic retinopathy is improving with injections.  We have been providing him with as needed diazepam pretreatment to get him to those appointments.  He is not in need of a refill.  Lipids are controlled with atorvastatin 20 mg daily and Zetia.  He has  recently had a co-Q10 as well. Weigh is down 8 lb  Past Medical History:   Diagnosis Date    Arthritis Years    Cancer     Renal cell    Diabetes mellitus     Erectile dysfunction Years    History of kidney removal     HL (hearing loss) Years    Hyperlipidemia     Kidney stone 1980       Past Surgical History:   Procedure Laterality Date    ADENOIDECTOMY      COLONOSCOPY      CYST REMOVAL      NEPHRECTOMY      TONSILLECTOMY          Family History   Problem Relation Age of Onset    Cancer Father     Arthritis Maternal Grandmother        Social History     Socioeconomic History    Marital status:    Tobacco Use    Smoking status: Never    Smokeless tobacco: Never   Vaping Use    Vaping status: Never Used   Substance and Sexual Activity    Alcohol use: Never    Drug use: Never    Sexual activity: Not Currently     Partners: Female     Birth control/protection: None, Vasectomy       Allergies   Allergen Reactions    Nsaids Other (See Comments)     Pt has 1 kidney unable to take NSAIDS        Immunization History   Administered Date(s) Administered    COVID-19 (MODERNA) 1st,2nd,3rd Dose Monovalent 08/16/2021, 09/13/2021    Flu Vaccine Quad PF >36MO 12/01/2016    Flublock Quad =>18yrs 09/14/2023    Pneumococcal Conjugate 20-Valent (PCV20) 06/21/2023    Pneumococcal Polysaccharide (PPSV23) 02/16/2017    Tdap 02/16/2017       Review of Systems:    Review of Systems   Constitutional:  Negative for fever and unexpected weight loss.   Eyes:  Negative for blurred vision.   Respiratory:  Negative for shortness of breath.    Cardiovascular:  Negative for chest pain, palpitations and leg swelling.   Gastrointestinal:  Negative for nausea, vomiting and GERD.   Endocrine: Negative for cold intolerance, heat intolerance, polydipsia and polyuria.   Genitourinary:  Negative for difficulty urinating, dysuria, flank pain, hematuria and urgency.   Musculoskeletal:  Negative for back pain.   Neurological:  Negative for tremors,  "speech difficulty and confusion.   All other systems reviewed and are negative.      Vitals:    04/18/25 1506 04/18/25 1621   BP: 144/76 120/78   Pulse: 78    SpO2: 96%    Weight: 85.7 kg (189 lb)    Height: 177.8 cm (70\")      Body mass index is 27.12 kg/m².      Current Outpatient Medications:     atorvastatin (LIPITOR) 20 MG tablet, TAKE ONE TABLET BY MOUTH ONCE NIGHTLY, Disp: 90 tablet, Rfl: 3    Continuous Glucose  (FreeStyle Ria 3 [place]) device, Use 1 each Daily., Disp: 1 each, Rfl: 11    Continuous Glucose Sensor (FreeStyle Ria 3 Plus Sensor), Use Daily., Disp: 2 each, Rfl: 11    diazePAM (Valium) 5 MG tablet, Take 1 tablet by mouth 2 (Two) Times a Day As Needed for Anxiety., Disp: 6 tablet, Rfl: 2    ezetimibe (ZETIA) 10 MG tablet, , Disp: , Rfl:     fluticasone (FLONASE) 50 MCG/ACT nasal spray, 2 sprays into the nostril(s) as directed by provider Daily., Disp: 5 mL, Rfl: 2    gabapentin (NEURONTIN) 600 MG tablet, Take 2 tablets by mouth 3 (Three) Times a Day., Disp: 540 tablet, Rfl: 0    metFORMIN ER (GLUCOPHAGE-XR) 500 MG 24 hr tablet, TAKE 2 TABLETS BY MOUTH TWICE A DAY, Disp: 180 tablet, Rfl: 2    Tirzepatide 7.5 MG/0.5ML solution auto-injector, Inject 7.5 mg under the skin into the appropriate area as directed 1 (One) Time Per Week., Disp: 3 mL, Rfl: 2    Physical Exam:    Physical Exam  Vitals reviewed.   Constitutional:       Appearance: He is well-developed.   HENT:      Head: Normocephalic and atraumatic.   Eyes:      Conjunctiva/sclera: Conjunctivae normal.   Neck:      Vascular: No JVD.   Cardiovascular:      Rate and Rhythm: Normal rate and regular rhythm.      Heart sounds: Normal heart sounds. No murmur heard.     No friction rub. No gallop.   Pulmonary:      Effort: Pulmonary effort is normal. No respiratory distress.      Breath sounds: Normal breath sounds. No wheezing or rales.   Chest:      Chest wall: No tenderness.   Abdominal:      Palpations: Abdomen is soft.      " Tenderness: There is no abdominal tenderness.   Musculoskeletal:         General: Normal range of motion.   Skin:     General: Skin is warm and dry.   Neurological:      Mental Status: He is alert and oriented to person, place, and time.         Procedures    Results Review:    I reviewed the patient's new clinical results.   A1c 6.7    Assessment/Plan:    Problem List Items Addressed This Visit       Single kidney    Type 2 diabetes mellitus with hyperglycemia, without long-term current use of insulin - Primary    Relevant Medications    Continuous Glucose Sensor (FreeStyle Ria 3 Plus Sensor)    metFORMIN ER (GLUCOPHAGE-XR) 500 MG 24 hr tablet    Tirzepatide 7.5 MG/0.5ML solution auto-injector    gabapentin (NEURONTIN) 600 MG tablet    Other Relevant Orders    POC Glycosylated Hemoglobin (Hb A1C) (Completed)    Microalbumin / Creatinine Urine Ratio - Urine, Clean Catch    Hyperlipidemia    Relevant Medications    ezetimibe (ZETIA) 10 MG tablet    atorvastatin (LIPITOR) 20 MG tablet    Neuropathy    Relevant Medications    Continuous Glucose  (FreeStyle Ria 3 [place]) device    gabapentin (NEURONTIN) 600 MG tablet    Stable proliferative diabetic retinopathy of both eyes associated with type 2 diabetes mellitus    Relevant Medications    metFORMIN ER (GLUCOPHAGE-XR) 500 MG 24 hr tablet    Tirzepatide 7.5 MG/0.5ML solution auto-injector     Other Visit Diagnoses         Colon cancer screening        Relevant Orders    Cologuard - Stool, Per Rectum      Anxiety        Relevant Medications    diazePAM (Valium) 5 MG tablet            Return in about 3 months (around 7/18/2025) for Annualdm/psa/lipids.    Nolberto Nicholson MD    Please note than portions of this note were completed wt a Voice Recognition Program

## 2025-04-19 LAB
ALBUMIN UR-MCNC: <1.2 MG/DL
CREAT UR-MCNC: 33 MG/DL
MICROALBUMIN/CREAT UR: NORMAL MG/G{CREAT}

## 2025-06-11 RX ORDER — TIRZEPATIDE 7.5 MG/.5ML
INJECTION, SOLUTION SUBCUTANEOUS
Qty: 2 ML | Refills: 1 | Status: SHIPPED | OUTPATIENT
Start: 2025-06-11

## 2025-07-21 ENCOUNTER — OFFICE VISIT (OUTPATIENT)
Dept: FAMILY MEDICINE CLINIC | Facility: CLINIC | Age: 65
End: 2025-07-21
Payer: COMMERCIAL

## 2025-07-21 VITALS
OXYGEN SATURATION: 96 % | BODY MASS INDEX: 26.74 KG/M2 | HEART RATE: 85 BPM | HEIGHT: 70 IN | WEIGHT: 186.8 LBS | SYSTOLIC BLOOD PRESSURE: 122 MMHG | DIASTOLIC BLOOD PRESSURE: 80 MMHG

## 2025-07-21 DIAGNOSIS — E11.65 TYPE 2 DIABETES MELLITUS WITH HYPERGLYCEMIA, WITHOUT LONG-TERM CURRENT USE OF INSULIN: Primary | ICD-10-CM

## 2025-07-21 DIAGNOSIS — Z12.5 PROSTATE CANCER SCREENING: ICD-10-CM

## 2025-07-21 DIAGNOSIS — E11.3553 STABLE PROLIFERATIVE DIABETIC RETINOPATHY OF BOTH EYES ASSOCIATED WITH TYPE 2 DIABETES MELLITUS: ICD-10-CM

## 2025-07-21 DIAGNOSIS — E78.5 HYPERLIPIDEMIA, UNSPECIFIED HYPERLIPIDEMIA TYPE: ICD-10-CM

## 2025-07-21 DIAGNOSIS — Z90.5 SINGLE KIDNEY: ICD-10-CM

## 2025-07-21 DIAGNOSIS — G62.9 NEUROPATHY: ICD-10-CM

## 2025-07-21 LAB
EXPIRATION DATE: ABNORMAL
HBA1C MFR BLD: 6.6 % (ref 4.5–5.7)
Lab: ABNORMAL

## 2025-07-21 PROCEDURE — 83036 HEMOGLOBIN GLYCOSYLATED A1C: CPT | Performed by: INTERNAL MEDICINE

## 2025-07-21 PROCEDURE — 99214 OFFICE O/P EST MOD 30 MIN: CPT | Performed by: INTERNAL MEDICINE

## 2025-07-21 RX ORDER — GABAPENTIN 600 MG/1
1200 TABLET ORAL 3 TIMES DAILY
Qty: 540 TABLET | Refills: 0 | Status: SHIPPED | OUTPATIENT
Start: 2025-07-21

## 2025-07-21 NOTE — PROGRESS NOTES
Answers submitted by the patient for this visit:  Chronic Condition Follow-up (Submitted on 7/18/2025)  Chief Complaint: PCP follow-up  diabetes: Yes  dry mouth: No  headaches: No  foot ulcerations: No  Medication compliance: all of the time  Treatment barriers: no complaince problems  Exercise: rarely  Current treatments: diet, oral medications, non-insulin injections  Home blood tests: 1-2 x week  Highest range: 140-180  Below 70: never  Meal planning: avoidance of concentrated sweets  Eye exam current: Yes  Sees podiatrist: No  Yasmani Harper  1960  [id number]  Patient Care Team:  Nolberto Nicholson MD as PCP - General (Internal Medicine)  Vicente Van MD as Consulting Physician (Gastroenterology)    Yasmani Harper is a 64 y.o. male here today for follow up.     This patient is accompanied by their self who contributes to the history of their care.    Chief Complaint:    Chief Complaint   Patient presents with    Diabetes        History of Present Illness:  I have reviewed and/or updated the patient's past medical, past surgical, family, social history, problem list and allergies as appropriate.     Tong is here to follow-up on his diabetes, dyslipidemia.  He also has significant diabetic neuropathy as well as  proliferative retinopathy.. Rentiopathy has improved. Still getting injections down to every 8 weeks. Remians on Mounjaro - occasionally cannot feel needle- and occasional fluid. He has gained a little weight- questioning whether he is getting the full dose. Exercising none and eating the same.  No hypoglycemic episodes.  His gabapentin still seems to be holding his neuropathy.  He remains on 1200 mg 3 times daily.    It is encouraging to note that his proliferative diabetic retinopathy is improving with injections.  We have been providing him with as needed diazepam pretreatment to get him to those appointments.  He is not in need of a refill.  Lipids are controlled with atorvastatin  "20 mg daily and Zetia.    Weigh is down and additional 3 lb..    He conituees to see opth - spaced out 2 mon per visit- only one eye being treated at this time.   Review of Systems   Constitutional:  Positive for unexpected weight loss. Negative for fatigue and unexpected weight gain.   Eyes:  Negative for blurred vision.   Respiratory:  Negative for shortness of breath.    Cardiovascular:  Negative for chest pain and palpitations.   Gastrointestinal:  Negative for nausea.   Neurological:  Negative for dizziness and confusion.       Vitals:    07/21/25 1531   BP: 122/80   Pulse: 85   SpO2: 96%   Weight: 84.7 kg (186 lb 12.8 oz)   Height: 177.8 cm (70\")     Body mass index is 26.8 kg/m².    Physical Exam  Vitals reviewed.   Constitutional:       Appearance: He is well-developed.   HENT:      Head: Normocephalic and atraumatic.   Eyes:      Conjunctiva/sclera: Conjunctivae normal.   Neck:      Vascular: No JVD.   Cardiovascular:      Rate and Rhythm: Normal rate and regular rhythm.      Heart sounds: Normal heart sounds. No murmur heard.     No friction rub. No gallop.   Pulmonary:      Effort: Pulmonary effort is normal. No respiratory distress.      Breath sounds: Normal breath sounds. No wheezing or rales.   Chest:      Chest wall: No tenderness.   Abdominal:      General: Bowel sounds are normal.      Palpations: Abdomen is soft.      Tenderness: There is no abdominal tenderness.   Genitourinary:     Comments: No CVA tendernesss   Musculoskeletal:         General: Normal range of motion.   Skin:     General: Skin is warm and dry.   Neurological:      Mental Status: He is alert and oriented to person, place, and time.         Procedures    Results Review:    I reviewed the patient's new clinical results.  A1cc  6.6.    Assessment/Plan:    Problem List Items Addressed This Visit       Single kidney    Type 2 diabetes mellitus with hyperglycemia, without long-term current use of insulin - Primary    Relevant " Medications    Continuous Glucose Sensor (FreeStyle Ria 3 Plus Sensor)    metFORMIN ER (GLUCOPHAGE-XR) 500 MG 24 hr tablet    Tirzepatide (Mounjaro) 7.5 MG/0.5ML solution auto-injector    gabapentin (NEURONTIN) 600 MG tablet    Other Relevant Orders    Comprehensive Metabolic Panel    CBC (No Diff)    POC Glycosylated Hemoglobin (Hb A1C) (Completed)    Hyperlipidemia    Relevant Medications    ezetimibe (ZETIA) 10 MG tablet    atorvastatin (LIPITOR) 20 MG tablet    Other Relevant Orders    Comprehensive Metabolic Panel    Lipid Panel    Neuropathy    Relevant Medications    Continuous Glucose  (FreeStyle Ria 3 [place]) device    gabapentin (NEURONTIN) 600 MG tablet    Other Relevant Orders    Comprehensive Metabolic Panel    Stable proliferative diabetic retinopathy of both eyes associated with type 2 diabetes mellitus    Relevant Medications    metFORMIN ER (GLUCOPHAGE-XR) 500 MG 24 hr tablet    Tirzepatide (Mounjaro) 7.5 MG/0.5ML solution auto-injector     Other Visit Diagnoses         Prostate cancer screening        Relevant Orders    PSA Screen            Plan of care reviewed with patient at the conclusion of today's visit. Education was provided regarding diagnosis and management.  Patient verbalizes understanding of and agreement with management plan.    No follow-ups on file.    Nolberto Nicholson MD      Please note than portions of this note were completed [facility] a Voice Recognition Program

## 2025-08-06 RX ORDER — TIRZEPATIDE 7.5 MG/.5ML
INJECTION, SOLUTION SUBCUTANEOUS
Qty: 2 ML | Refills: 1 | Status: SHIPPED | OUTPATIENT
Start: 2025-08-06

## 2025-08-14 RX ORDER — ATORVASTATIN CALCIUM 20 MG/1
20 TABLET, FILM COATED ORAL NIGHTLY
Qty: 90 TABLET | Refills: 0 | Status: SHIPPED | OUTPATIENT
Start: 2025-08-14